# Patient Record
Sex: FEMALE | Race: WHITE | NOT HISPANIC OR LATINO
[De-identification: names, ages, dates, MRNs, and addresses within clinical notes are randomized per-mention and may not be internally consistent; named-entity substitution may affect disease eponyms.]

---

## 2018-12-04 ENCOUNTER — APPOINTMENT (OUTPATIENT)
Dept: GASTROENTEROLOGY | Facility: HOSPITAL | Age: 68
End: 2018-12-04

## 2021-10-27 ENCOUNTER — APPOINTMENT (OUTPATIENT)
Dept: GASTROENTEROLOGY | Facility: CLINIC | Age: 71
End: 2021-10-27
Payer: MEDICARE

## 2021-10-27 VITALS
DIASTOLIC BLOOD PRESSURE: 70 MMHG | SYSTOLIC BLOOD PRESSURE: 140 MMHG | BODY MASS INDEX: 29.88 KG/M2 | WEIGHT: 175 LBS | TEMPERATURE: 97.7 F | HEIGHT: 64 IN | HEART RATE: 96 BPM | OXYGEN SATURATION: 98 %

## 2021-10-27 DIAGNOSIS — R19.7 DIARRHEA, UNSPECIFIED: ICD-10-CM

## 2021-10-27 DIAGNOSIS — Z86.79 PERSONAL HISTORY OF OTHER DISEASES OF THE CIRCULATORY SYSTEM: ICD-10-CM

## 2021-10-27 DIAGNOSIS — Z78.9 OTHER SPECIFIED HEALTH STATUS: ICD-10-CM

## 2021-10-27 PROCEDURE — 99214 OFFICE O/P EST MOD 30 MIN: CPT

## 2021-10-27 RX ORDER — IRBESARTAN 75 MG/1
75 TABLET ORAL
Refills: 0 | Status: ACTIVE | COMMUNITY

## 2021-10-27 RX ORDER — ROSUVASTATIN CALCIUM 5 MG/1
5 TABLET, FILM COATED ORAL
Refills: 0 | Status: ACTIVE | COMMUNITY

## 2021-10-27 RX ORDER — FAMOTIDINE 40 MG/1
40 TABLET, FILM COATED ORAL
Refills: 0 | Status: ACTIVE | COMMUNITY

## 2021-10-27 NOTE — HISTORY OF PRESENT ILLNESS
[de-identified] : Presents  after episode  of  diverticulitis  in June 2021 and  again in Oct 2021.  Currently painfree after 2  courses of  Augmentin.  Only current  complaint is  diarrhea ( improving on probiotics) \par \par Note  reviewed from PMD Nelia Goodwin MD  was notable  for abdominal pain that stated Sept 25, 2021.  CT scan on 10/21/21 c/w  diverticulitis ( report reviewed).  Treated  with Augmentin for   diverticulitis.  Pain returned after course of  BID  Augmentin...retreated with TID  dosing ( limited by nausea).  Apparently had  CT proven diverticulitis  ~  10 years ago. \par \par  At PMD visit on 10/20/21 c/o 3  days  of  watery stool. C.diff was indeterminate  / WBC = 9.4 / normal CMET.  \par \par -EGD / Colonoscopy 12/2018 notable  for gastritis  / duodenal villous  blunting /  / hemorrhoids / diverticulosis.  Labs  were also c/w  celiac  disease. Gluten free diet was recommended\par \par Pertinent available records reviewed\par

## 2021-10-27 NOTE — ASSESSMENT
[FreeTextEntry1] : Diverticulitis...2  episodes in 3  months.  Would  consider surgical evaluation if  has  additional episodes. Low  fiber diet  x  2  weeks , then high fiber. Stool for C. diff if  diarrhea persists past Monday ( stool kit  given to patient) .  Continue  probiotic

## 2022-11-04 ENCOUNTER — NON-APPOINTMENT (OUTPATIENT)
Age: 72
End: 2022-11-04

## 2022-11-14 ENCOUNTER — APPOINTMENT (OUTPATIENT)
Dept: BREAST CENTER | Facility: CLINIC | Age: 72
End: 2022-11-14

## 2022-11-14 ENCOUNTER — NON-APPOINTMENT (OUTPATIENT)
Age: 72
End: 2022-11-14

## 2022-11-14 VITALS
HEIGHT: 64 IN | BODY MASS INDEX: 29.02 KG/M2 | SYSTOLIC BLOOD PRESSURE: 171 MMHG | DIASTOLIC BLOOD PRESSURE: 81 MMHG | WEIGHT: 170 LBS | HEART RATE: 81 BPM

## 2022-11-14 DIAGNOSIS — Z87.19 PERSONAL HISTORY OF OTHER DISEASES OF THE DIGESTIVE SYSTEM: ICD-10-CM

## 2022-11-14 DIAGNOSIS — Z78.9 OTHER SPECIFIED HEALTH STATUS: ICD-10-CM

## 2022-11-14 DIAGNOSIS — Z80.0 FAMILY HISTORY OF MALIGNANT NEOPLASM OF DIGESTIVE ORGANS: ICD-10-CM

## 2022-11-14 DIAGNOSIS — M65.30 TRIGGER FINGER, UNSPECIFIED FINGER: ICD-10-CM

## 2022-11-14 PROCEDURE — 99205 OFFICE O/P NEW HI 60 MIN: CPT

## 2022-11-14 RX ORDER — AMOXICILLIN AND CLAVULANATE POTASSIUM 875; 125 MG/1; MG/1
875-125 TABLET, COATED ORAL
Qty: 14 | Refills: 0 | Status: DISCONTINUED | COMMUNITY
Start: 2022-06-01 | End: 2022-11-14

## 2022-11-14 NOTE — CONSULT LETTER
[Dear  ___] : Dear  [unfilled], [( Thank you for referring [unfilled] for consultation for _____ )] : Thank you for referring [unfilled] for consultation for [unfilled] [Please see my note below.] : Please see my note below. [Consult Closing:] : Thank you very much for allowing me to participate in the care of this patient.  If you have any questions, please do not hesitate to contact me. [Sincerely,] : Sincerely, [FreeTextEntry3] : An Rocha MS DO\par Breast Surgeon\par [facility] \[place] [address]\par

## 2022-11-14 NOTE — PHYSICAL EXAM
[Normocephalic] : normocephalic [Atraumatic] : atraumatic [Supple] : supple [No Supraclavicular Adenopathy] : no supraclavicular adenopathy [Examined in the supine and seated position] : examined in the supine and seated position [No Nipple Retraction] : no left nipple retraction [No Nipple Discharge] : no left nipple discharge [No Axillary Lymphadenopathy] : no left axillary lymphadenopathy [No Edema] : no edema [No Rashes] : no rashes [No Ulceration] : no ulceration [Symmetrical] : symmetrical [de-identified] : retroareolar sebaceous cyst, nontender, no redness [de-identified] : healing bx site with associated induration [de-identified] : right nipple crease (chronic)

## 2022-11-14 NOTE — REVIEW OF SYSTEMS
[Eyes Itch] : itching of the eyes [Nasal Discharge] : nasal discharge [Abdominal Pain] : abdominal pain [Constipation] : constipation [Heartburn] : heartburn [Joint Swelling] : joint swelling [Joint Stiffness] : joint stiffness [Hand Pain] : hand pain [Hand Stiffness] : stiffness of the hand [Breast Lump] : breast lump [Negative] : Heme/Lymph [FreeTextEntry2] : joint pain

## 2022-11-14 NOTE — HISTORY OF PRESENT ILLNESS
[FreeTextEntry1] : This is a 71 year old female referred by Dr. Richardson for newly diagnosed ductal carcinoma in situ of the left breast.  The patient underwent screening mammogram and ultrasound for density on 10/20/2022 at [facility]. Mammographic findings showed a cluster of indeterminate coarse calcifications in the left upper slightly medial breast. A stereotactic biopsy was recommended. Ultrasound showed no suspicious findings. The patient underwent a left 12:00 (U-shaped clip) on 11/1/2022. Pathology findings showed low to intermediate grade DCIS, ER moderately to strongly positive at >90%, [place] moderate to strongly positive at 5-10%.\par The patient states she had one breast biopsy at [facility] 15 years ago on the left side. Which was ultrasound and no clip was left. She also saw Dr. Ibrahim about 10 years ago and had a left breast cyst drained. She presents with her  for consultation. She is retried from accounting/bookkeeping.\par \par She does SBE. \par She has not noticed a change in her breast or a breast lump.\par She has not noticed a change in her nipple or nipple area. She states her right nipple has been creased for as long as she can recall.\par She has not noticed a change in the skin of the breast.\par She is not experiencing nipple discharge.\par She is not experiencing breast pain.\par She has not noticed a lump or lymph node under the armpit. \par \par BREAST CANCER RISK FACTORS\par Menarche: 12\par Date of LMP:1996\par Menopause:Post, age unknown\par Grav: 2      Para:  2\par Age at first live birth: 27\par Nursed: no\par Hysterectomy: Y 1996\par Oophorectomy: N\par OCP:  yes 5y\par HRT: no\par Last pap/pelvic exam: 2021 WNL\par Related family history:  SIster Breast CA age 55\par Ashkenazi: no\par Mastery risk assessment:  n/a\par BRCA testing: yes sister tested negative\par Bra size:  40B\par \par Last mammogram:  10/20/2022                            Location:[facility]\par Report reviewed.                                 Images reviewed.\par Results: Birads 4\par Left upper slightly medial breast cluster of indeterminate coarse calcifications. Rec: stereotactic biopsy \par \par Last ultrasound:  10/20/2020               Location: [facility]\par Report reviewed.                                 Images reviewed. \par Results: Birads 2\par No evidence of malignancy\par \par Last MRI:  never                                           Location:\par Report reviewed.\par

## 2022-11-17 ENCOUNTER — RESULT REVIEW (OUTPATIENT)
Age: 72
End: 2022-11-17

## 2022-11-20 DIAGNOSIS — N60.81 OTHER BENIGN MAMMARY DYSPLASIAS OF RIGHT BREAST: ICD-10-CM

## 2022-11-22 ENCOUNTER — APPOINTMENT (OUTPATIENT)
Dept: HEMATOLOGY ONCOLOGY | Facility: CLINIC | Age: 72
End: 2022-11-22

## 2022-11-22 NOTE — DISCUSSION/SUMMARY
[FreeTextEntry1] : The visit was provided via telehealth using real-time 2-way audio visual technology. The patient, Aleshia Chu, was located at home, in [place], [place], at the time of the visit. The provider, Michelle Merlos, was located at the medical office in [place], [place] at the time of the visit. Verbal consent for telehealth services was given on 22 by the patient, Aleshia Chu.\par \par REASON FOR CONSULT\par Aleshia Chu is a 71-year-old female referred by Dr. An Rocha for cancer genetic counseling and risk assessment due to a new diagnosis of breast cancer. Ms. Chu was seen via telehealth on 2022 at which time medical and family history was ascertained and a pedigree constructed. \par \par RELEVANT MEDICAL HISTORY\par Ms. Chu was diagnosed with a new left breast cancer at the age of 71. Pathology report revealed ductal carcinoma in situ (ER+/[place]+). Lumpectomy is currently scheduled for 22. \par \par OTHER MEDICAL AND SURGICAL HISTORY:\par •	Medical History: Celiac disease, diverticulitis\par •	Surgical History: tonsillectomy, hysterectomy  d/t fibroids (ovaries reportedly remain intact)\par \par OB/GYN HISTORY:\par Obstetrical History: \par Age at Menarche: 12\par Menopausal Status: Post-menopausal with LMP  d/t hysterectomy\par Age at First Live Birth: 27\par Oral Contraceptive Use: Yes, 5-7 years\par Hormone Replacement Therapy: No\par \par CANCER SCREENING HISTORY:  \par Breast: \par •	Mammography: 10/20/22- rec left breast biopsy\par •	Sonography: 10/20/22- rec left breast biopsy\par •	MRI: Declined, unable to lie prone\par GYN:\par •	Pelvic Examination: Annual- reportedly wnl, history of fibroids\par Colon:\par •	Colonoscopy: 2018- no polyps identified, repeat in 5-10 years\par Skin:  \par •	FBSE: Yes\par •	Lesions biopsied/removed: No\par \par SOCIAL HISTORY:\par •	Tobacco-product use: No\par •	Environmental exposures: No \par \par FAMILY HISTORY:\par Maternal ancestry was reported as Areli and paternal ancestry was reported as British/Yoruba. Ashkenazi Mandaen ancestry was denied. A detailed family history of cancer was ascertained, see below and scanned chart for pedigree. \par \par 	Of note, Ms. Chu reported her sister went through her own breast cancer diagnosis and treatment around 10 years ago and reportedly underwent genetic testing with negative results however a copy of her report was unavailable to review today.\par \par According to Ms. Chu no one else in the family has had germline testing for cancer susceptibility. Consanguinity was denied. \par 	\par RISK ASSESSMENT:\par Ms. Chu’s personal and family history is suggestive of a hereditary cancer syndrome given her new diagnosis of breast cancer at the age of 71, her sister’s history of breast cancer, and her mother’s history of pancreatic cancer. The patient meets National Comprehensive Cancer Network (NCCN) criteria for genetic testing. \par \par Given that genetic testing may impact her treatment plans, we recommended the [facility] Breast STAT Panel testing for genes associated with breast cancer (typically results within 5-12 days). This test analyzes 9 genes: MARIA, BRCA1, BRCA2, CDH1, CHEK2, PALB2, PTEN, STK11, and TP53.\par \par The risks, benefits and limitations of genetic testing were discussed with Ms. Chu. In addition, we discussed the purpose of genetic testing and possible test results (positive, negative, inconclusive) along with associated medical management options and psychosocial implications. Insurance coverage and potential out of pocket costs were also discussed. \par \par It was explained that risk assessment is based upon medical and family history as provided and may change in the future should new information be obtained. \par \par Following our discussion, Ms. Chu consented to the above-mentioned genetic testing panel. A saliva kit was previously given to the patient during her visit with Dr. Rocha and she will submit her sample to [facility] today.\par \par PLAN:\par \par 1.	Saliva sample will be collected and sent to [facility] for analysis. \par 2.	We will contact Ms. Chu to schedule a follow-up appointment once the results are available. Results from the STAT panel generally return 5-12 days after the lab receives the patient’s sample. \par \par For any additional questions please call Cancer Genetics at [phone]. \par \par \par Michelle Merlos MS, [facility]\par Genetic Counselor, Cancer Genetics\par \par \par \par \par \par \par

## 2022-11-29 ENCOUNTER — NON-APPOINTMENT (OUTPATIENT)
Age: 72
End: 2022-11-29

## 2022-12-06 ENCOUNTER — NON-APPOINTMENT (OUTPATIENT)
Age: 72
End: 2022-12-06

## 2022-12-06 NOTE — DISCUSSION/SUMMARY
[FreeTextEntry1] : REASON FOR CONSULT\par Aleshia Chu is a 71-year-old female who was contacted on December 6, 2022 for a discussion regarding her negative genetic testing results related to hereditary cancer predisposition. This session was conducted via telephone. \par \par Ms. Chu was originally seen by the Cancer Genetics Service via telehealth on November 22, 2022 for hereditary cancer predisposition risk assessment due to a new diagnosis of breast cancer. At that time, Ms. Chu decided to pursue genetic testing using [facility] breast STAT panel.\par \par TEST RESULTS: NEGATIVE\par NO pathogenic (disease-causing) variants or variants of uncertain significance were detected in any of the following genes [9]:  MARIA, BRCA1, BRCA2, CDH1, CHEK2, PALB2, PTEN, STK11, and TP53.\par \par RESULTS INTERPRETATION AND ASSESSMENT:\par Given Ms. Chu’s personal and current reported family history of cancer, and her negative genetic test results, the following screening guidelines and risk-reducing recommendations were discussed:\par \par BREAST: \par •	It was discussed these negative results should not impact patient’s surgical plan. \par •	Long-term management and surveillance should be based on Ms. Chu’s on- or post-treatment protocol as recommended by her breast care team. \par \par OTHER:\par •	In the absence of other indications, Ms. Chu should practice age-appropriate cancer screening of other organ systems as recommended for the general population.\par \par \par We also discussed the limitations of negative results:\par 1.	The cause of Ms. Chu’s personal and family history of cancer remains unknown. The cancer(s) may have developed randomly, or due to environmental factors.  \par 2.	This negative result does not completely rule out a hereditary basis for the reported personal and/or family history due to limitations in technology or a variant being present in an unidentified gene. \par 3.	Variants in other genes would not be identified by this analysis, so this negative result does not rule out the likelihood of having a mutation in a different hereditary cancer gene or the possibility of ever developing cancer.\par 4.	It is possible there is a hereditary cancer predisposition gene mutation in the family, but the patient did not inherit it. \par \par We informed Ms. Chu that our knowledge of genetics and inherited cancer conditions is changing rapidly. Therefore, we recommended that Ms. Chu contact our office, every 2 to 3 years, to discuss relevant advances in cancer genetics.  We emphasized the importance of re-contacting us with updates regarding her personal and family history of cancer as well as any updates regarding additional cancer genetic test results performed for the patient and/or family members.  Such updates could possibly change our risk assessment and recommendations. \par \par We also discussed additional genetic testing for genes associated with breast, gynecological, and pancreatic cancer not included on her initial panel. Ms. Chu stated she would like to speak to her family about additional genetic testing first and will get back to us if she decides to proceed.\par \par PLAN:\par 1.	These results do not change Ms. Chu’s medical management. Long-term management and surveillance should be based on the patient’s on- or post-treatment protocol as recommended by her breast care team (and general population guidelines for other cancers).\par 2.	Patient informed consult note(s) will be available through their [facility] patient portal and genetic test results will be released via [facility]’s portal.\par 3.	Patient will think about whether she wants to pursue additional genetic testing beyond the genes included on the STAT and will get back to us. \par 4.	Ms. Chu was encouraged to contact us every 2-3 years to discuss relevant advances in cancer genetics, or sooner if there are any changes in her personal or family history of cancer.\par \par \par For any additional questions please call Cancer Genetics at [phone]. \par \par \par Michelle Merlos MS, [facility]\par Genetic Counselor, Cancer Genetics\par \par \par \par \par \par

## 2022-12-08 ENCOUNTER — RESULT REVIEW (OUTPATIENT)
Age: 72
End: 2022-12-08

## 2022-12-08 ENCOUNTER — APPOINTMENT (OUTPATIENT)
Dept: BREAST CENTER | Facility: HOSPITAL | Age: 72
End: 2022-12-08

## 2022-12-08 PROCEDURE — 76098 X-RAY EXAM SURGICAL SPECIMEN: CPT | Mod: 26

## 2022-12-08 PROCEDURE — 14301 TIS TRNFR ANY 30.1-60 SQ CM: CPT

## 2022-12-08 PROCEDURE — 19301 PARTIAL MASTECTOMY: CPT | Mod: LT,59

## 2022-12-16 ENCOUNTER — APPOINTMENT (OUTPATIENT)
Dept: BREAST CENTER | Facility: CLINIC | Age: 72
End: 2022-12-16

## 2022-12-16 PROCEDURE — 99024 POSTOP FOLLOW-UP VISIT: CPT

## 2022-12-19 NOTE — HISTORY OF PRESENT ILLNESS
[FreeTextEntry1] : This is a 71 year old female referred by Dr. Richardson for newly diagnosed ductal carcinoma in situ of the left breast.  The patient underwent screening mammogram and ultrasound for density on 10/20/2022 at [facility]. Mammographic findings showed a cluster of indeterminate coarse calcifications in the left upper slightly medial breast. A stereotactic biopsy was recommended. Ultrasound showed no suspicious findings. The patient underwent a left 12:00 (U-shaped clip) on 11/1/2022. Pathology findings showed low to intermediate grade DCIS, ER moderately to strongly positive at >90%, [place] moderate to strongly positive at 5-10%.\par The patient states she had one breast biopsy at [facility] 15 years ago on the left side. Which was ultrasound and no clip was left. She also saw Dr. Ibrahim about 10 years ago and had a left breast cyst drained. She presents with her  for consultation. She is retried from accounting/bookkeeping.\par \par She is s/p left partial mastectomy on 12/8/2022 pathology showed 10mm DCIS intermediate grade, focal involvment of inferior marging and 1.5mm margin anterior and medial, rest >4mm, ER positive (%, [place] positive 5%. Doing well post op.\par \par She does SBE. \par She has not noticed a change in her breast or a breast lump.\par She has not noticed a change in her nipple or nipple area. She states her right nipple has been creased for as long as she can recall.\par She has not noticed a change in the skin of the breast.\par She is not experiencing nipple discharge.\par She is not experiencing breast pain.\par She has not noticed a lump or lymph node under the armpit. \par \par BREAST CANCER RISK FACTORS\par Menarche: 12\par Date of LMP:1996\par Menopause:Post, age unknown\par Grav: 2      Para:  2\par Age at first live birth: 27\par Nursed: no\par Hysterectomy: Y 1996\par Oophorectomy: N\par OCP:  yes 5y\par HRT: no\par Last pap/pelvic exam: 2021 WNL\par Related family history:  SIster Breast CA age 55\par Ashkenazi: no\par Mastery risk assessment:  n/a\par BRCA testing: yes sister tested negative\par Bra size:  40B\par \par Last mammogram:  10/20/2022                            Location:[facility]\par Report reviewed.                                 Images reviewed.\par Results: Birads 4\par Left upper slightly medial breast cluster of indeterminate coarse calcifications. Rec: stereotactic biopsy \par \par Last ultrasound:  10/20/2020               Location: [facility]\par Report reviewed.                                 Images reviewed. \par Results: Birads 2\par No evidence of malignancy\par \par Last MRI:  never                                           Location:\par Report reviewed.\par

## 2022-12-19 NOTE — ASSESSMENT
[FreeTextEntry1] : doing well\par path reviewed copy given\par rec re-excision for inferior/anterior/ medial margin\par has appt next week\par i believe i was able to answer her questions\par She knows to call or return sooner should any concerns or questions arise.\par

## 2022-12-20 ENCOUNTER — APPOINTMENT (OUTPATIENT)
Dept: BREAST CENTER | Facility: HOSPITAL | Age: 72
End: 2022-12-20

## 2022-12-20 ENCOUNTER — RESULT REVIEW (OUTPATIENT)
Age: 72
End: 2022-12-20

## 2022-12-20 PROCEDURE — 14301 TIS TRNFR ANY 30.1-60 SQ CM: CPT | Mod: 58

## 2022-12-20 PROCEDURE — 19301 PARTIAL MASTECTOMY: CPT | Mod: LT,59,58

## 2022-12-23 DIAGNOSIS — R21 RASH AND OTHER NONSPECIFIC SKIN ERUPTION: ICD-10-CM

## 2022-12-23 RX ORDER — METHYLPREDNISOLONE 4 MG/1
4 TABLET ORAL
Qty: 1 | Refills: 0 | Status: ACTIVE | COMMUNITY
Start: 2022-12-23 | End: 1900-01-01

## 2023-01-03 ENCOUNTER — APPOINTMENT (OUTPATIENT)
Dept: BREAST CENTER | Facility: CLINIC | Age: 73
End: 2023-01-03
Payer: MEDICARE

## 2023-01-03 PROCEDURE — 99024 POSTOP FOLLOW-UP VISIT: CPT

## 2023-01-03 NOTE — ASSESSMENT
[FreeTextEntry1] : doing well\par path reviewed copy given\par rec med/onc cs and rad/onc she prefers [facility] location\par f/u 1 month\par She knows to call or return sooner should any concerns or questions arise.\par

## 2023-01-03 NOTE — PHYSICAL EXAM
[de-identified] : healed incision [de-identified] : healing periareolar incision, UIQ there is nodularity

## 2023-01-03 NOTE — HISTORY OF PRESENT ILLNESS
[FreeTextEntry1] : This is a 72 year old female referred by Dr. Richardson for newly diagnosed ductal carcinoma in situ of the left breast.  The patient underwent screening mammogram and ultrasound for density on 10/20/2022 at [facility]. Mammographic findings showed a cluster of indeterminate coarse calcifications in the left upper slightly medial breast. A stereotactic biopsy was recommended. Ultrasound showed no suspicious findings. The patient underwent a left 12:00 (U-shaped clip) on 11/1/2022. Pathology findings showed low to intermediate grade DCIS, ER moderately to strongly positive at >90%, [place] moderate to strongly positive at 5-10%.\par The patient states she had one breast biopsy at [facility] 15 years ago on the left side. Which was ultrasound and no clip was left. She also saw Dr. Ibrahim about 10 years ago and had a left breast cyst drained. She presents with her  for consultation. She is retried from accounting/bookkeeping.\par \par She is s/p left partial mastectomy on 12/8/2022 pathology showed 10mm DCIS intermediate grade, focal involvment of inferior marging and 1.5mm margin anterior and medial, rest >4mm, ER positive (%, [place] positive 5%. Doing well post op. She had post op rash. She had re-excision of margins 12/20/22 anterior, medial, inferior which were all negative.\par \par She does SBE. \par She has not noticed a change in her breast or a breast lump.\par She has not noticed a change in her nipple or nipple area. She states her right nipple has been creased for as long as she can recall.\par She has not noticed a change in the skin of the breast.\par She is not experiencing nipple discharge.\par She is not experiencing breast pain.\par She has not noticed a lump or lymph node under the armpit. \par \par BREAST CANCER RISK FACTORS\par Menarche: 12\par Date of LMP:1996\par Menopause:Post, age unknown\par Grav: 2      Para:  2\par Age at first live birth: 27\par Nursed: no\par Hysterectomy: Y 1996\par Oophorectomy: N\par OCP:  yes 5y\par HRT: no\par Last pap/pelvic exam: 2021 WNL\par Related family history:  SIster Breast CA age 55\par Ashkenazi: no\par Mastery risk assessment:  n/a\par BRCA testing: yes sister tested negative\par Bra size:  40B\par \par Last mammogram:  10/20/2022                            Location:[facility]\par Report reviewed.                                 Images reviewed.\par Results: Birads 4\par Left upper slightly medial breast cluster of indeterminate coarse calcifications. Rec: stereotactic biopsy \par \par Last ultrasound:  10/20/2020               Location: [facility]\par Report reviewed.                                 Images reviewed. \par Results: Birads 2\par No evidence of malignancy\par \par Last MRI:  never                                           Location:\par Report reviewed.\par

## 2023-01-11 ENCOUNTER — APPOINTMENT (OUTPATIENT)
Dept: RADIATION ONCOLOGY | Facility: CLINIC | Age: 73
End: 2023-01-11
Payer: MEDICARE

## 2023-01-11 PROCEDURE — 99205 OFFICE O/P NEW HI 60 MIN: CPT | Mod: 25

## 2023-01-12 VITALS
BODY MASS INDEX: 29.02 KG/M2 | OXYGEN SATURATION: 98 % | HEIGHT: 64 IN | HEART RATE: 80 BPM | DIASTOLIC BLOOD PRESSURE: 80 MMHG | RESPIRATION RATE: 18 BRPM | SYSTOLIC BLOOD PRESSURE: 160 MMHG | WEIGHT: 170 LBS

## 2023-01-12 NOTE — DISEASE MANAGEMENT
[Pathological] : TNM Stage: p [0] : 0 [FreeTextEntry4] : DCIS of the Left Breast, ER/NJ+ [TTNM] : is [NTNM] : X [MTNM] : X

## 2023-01-12 NOTE — LETTER CLOSING
[Consult Closing:] : Thank you for allowing me to participate in the care of this patient.  If you have any questions, please do not hesitate to contact me. [Sincerely yours,] : Sincerely yours, [FreeTextEntry3] : Stephanie Wadsworth MD\par

## 2023-01-12 NOTE — HISTORY OF PRESENT ILLNESS
[FreeTextEntry1] : Ms. Chu is 72 year old female recently diagnosed with Stage 0 ER/NH+ DCIS of the left breast.  She and her  present for a consultation to discuss adjuvant radiation therapy. \par \par Ms Chu underwent bilateral mammogram and US (10/20/22) that demonstrated indeterminate left breast calcifications. No abnormal lymph nodes were demonstrated. She underwent a core needle biopsy performed on 22 that revealed DCIS, low to intermediate nuclear grade. ER + >90% and [place] + 5-10%. \par \par On 22, she underwent left partial mastectomy performed by Dr. Rocha and pathology revealed:\par Ductal carcinoma in situ, Size (Extent) of DCIS: 10 Millimeters (mm), Nuclear Grade: Grade II (intermediate), Necrosis: Present, focal (small foci or single cell necrosis), Microcalcifications: Present in DCIS, Margin Status: DCIS present at margin, Margin(s) Involved by DCIS: Inferior - focal, on one slide, pT Category: pTis (DCIS), Estrogen Receptor (ER) Status: Positive / Estrogen Receptor (ER) Status: Positive\par \par On 22, she underwent a margin re-excision of the anterior, new medial, and new inferior margin. Pathology was negative for residual carcinoma. \par \par Genetic testing was negative. \par \par GYN HX: \par Menarche: 12, Date of LMP:, Menopause: post, , hysterectomy in , OCP 5 yrs, no HRT. \par Family Hx: sister, breast cancer, 55

## 2023-01-24 ENCOUNTER — RESULT REVIEW (OUTPATIENT)
Age: 73
End: 2023-01-24

## 2023-01-24 ENCOUNTER — APPOINTMENT (OUTPATIENT)
Dept: HEMATOLOGY ONCOLOGY | Facility: CLINIC | Age: 73
End: 2023-01-24
Payer: MEDICARE

## 2023-01-24 VITALS
WEIGHT: 173.25 LBS | HEIGHT: 64 IN | HEART RATE: 90 BPM | OXYGEN SATURATION: 97 % | DIASTOLIC BLOOD PRESSURE: 95 MMHG | TEMPERATURE: 97.2 F | SYSTOLIC BLOOD PRESSURE: 167 MMHG | BODY MASS INDEX: 29.58 KG/M2 | RESPIRATION RATE: 16 BRPM

## 2023-01-24 DIAGNOSIS — Z00.00 ENCOUNTER FOR GENERAL ADULT MEDICAL EXAMINATION W/OUT ABNORMAL FINDINGS: ICD-10-CM

## 2023-01-24 PROCEDURE — 36415 COLL VENOUS BLD VENIPUNCTURE: CPT

## 2023-01-24 PROCEDURE — 99205 OFFICE O/P NEW HI 60 MIN: CPT | Mod: 25

## 2023-01-24 NOTE — PHYSICAL EXAM
[Fully active, able to carry on all pre-disease performance without restriction] : Status 0 - Fully active, able to carry on all pre-disease performance without restriction [Normal] : affect appropriate [de-identified] : b/l healed scars

## 2023-01-24 NOTE — HISTORY OF PRESENT ILLNESS
[de-identified] : 72 year old female presents today for initial consultation of DCIS, referred by Josefina.  The patient underwent screening mammogram and ultrasound for density on 10/20/2022 at [facility]. Mammographic findings showed a cluster of indeterminate coarse calcifications in the left upper slightly medial breast. A stereotactic biopsy was recommended. Ultrasound showed no suspicious findings. The patient underwent a left 12:00 on 2022. Pathology findings showed low to intermediate grade DCIS, ER moderately to strongly positive at >90%, NE moderate to strongly positive at 5-10%.  An MRI was recommended, however the patient was unable to lay prone for the procedure.  \par \par She underwent a left partial mastectomy on 2022, pathology showed 10mm DCIS intermediate grade, focal involvement of inferior margin and 1.5mm margin anterior and medial, rest >4mm, ER positive (%, NE positive 5%.  She had re-excision of margins 22 anterior, medial, inferior which were all negative.\par \par She met with Dr. Wadsworth in consultation to discuss adjuvant radiation to the left breast to control local control and decrease her risk of a perinvasive or invasive recurrence, they obtained a DCISion RT.  \par \par Last BMD- [facility] - unsure of results \par \par BREAST CANCER RISK FACTORS\par Menarche: 12\par Date of LMP:\par Menopause:49\par Grav: 2 Para: 2\par Age at first live birth: 27\par Nursed: no\par Hysterectomy: Y, partial in  due to fibroids\par Oophorectomy: N\par OCP: yes 5y\par HRT: Vagifem x3-4 years\par Last pap/pelvic exam:  WNL\par Related family history: SIster Breast CA age 55, alive and well, mother with pancreatic cancer diagnosed  at 81\par Ashkenazi: no\par Genetic testing- negative?  \par \par \par

## 2023-01-24 NOTE — ASSESSMENT
[FreeTextEntry1] : 72 year old female referred for evaluation of DCIS diagnosed after mammogram on 10/20/2022 showed a cluster of indeterminate coarse calcifications in the left upper slightly medial breast.\par Pathology findings showed low to intermediate grade DCIS, ER moderately to strongly positive at >90%, TX moderate to strongly positive at 5-10%.  An MRI was recommended, however the patient was unable to lay prone for the procedure.  \par \par She underwent a left partial mastectomy on 12/8/2022, pathology showed \par # left breast 10mm DCIS intermediate grade, focal involvement of inferior margin and 1.5mm margin anterior and medial, rest >4mm, ER positive (%, TX positive 5%.  She had re-excision of margins 12/20/22 anterior, medial, inferior which were all negative.\par # right breast - benign\par \par We reviewed pathology report, prognostic and therapeutic implications of receptor status, indication for risk reduction treatment with either SERM or AI.  Side effects and monitoring parameters were reviewed with patient.\par \par She was offered anastrozole.\par \par Dexa report ordered.\par \par

## 2023-02-10 ENCOUNTER — APPOINTMENT (OUTPATIENT)
Dept: BREAST CENTER | Facility: CLINIC | Age: 73
End: 2023-02-10
Payer: MEDICARE

## 2023-02-10 PROCEDURE — 99024 POSTOP FOLLOW-UP VISIT: CPT

## 2023-02-10 NOTE — HISTORY OF PRESENT ILLNESS
[FreeTextEntry1] : This is a 72 year old female referred by Dr. Richardson for newly diagnosed ductal carcinoma in situ of the left breast.  The patient underwent screening mammogram and ultrasound for density on 10/20/2022 at [facility]. Mammographic findings showed a cluster of indeterminate coarse calcifications in the left upper slightly medial breast. A stereotactic biopsy was recommended. Ultrasound showed no suspicious findings. The patient underwent a left 12:00 (U-shaped clip) on 11/1/2022. Pathology findings showed low to intermediate grade DCIS, ER moderately to strongly positive at >90%, OH moderate to strongly positive at 5-10%.\par The patient states she had one breast biopsy at [facility] 15 years ago on the left side. Which was ultrasound and no clip was left. She also saw Dr. Ibrahim about 10 years ago and had a left breast cyst drained. She presents with her  for consultation. She is retried from accounting/bookkeeping.\par \par She is s/p left partial mastectomy on 12/8/2022 pathology showed 10mm DCIS intermediate grade, focal involvment of inferior marging and 1.5mm margin anterior and medial, rest >4mm, ER positive (%, OH positive 5%. Doing well post op. She had post op rash. She had re-excision of margins 12/20/22 anterior, medial, inferior which were all negative. She saw Dr. Alfaro. DCision RT was low and RTx is being omitted. She started anastrozole with 2/1/2023.\par \par She does SBE. \par She has not noticed a change in her breast or a breast lump.\par She has not noticed a change in her nipple or nipple area. She states her right nipple has been creased for as long as she can recall.\par She has not noticed a change in the skin of the breast.\par She is not experiencing nipple discharge.\par She is not experiencing breast pain.\par She has not noticed a lump or lymph node under the armpit. \par \par BREAST CANCER RISK FACTORS\par Menarche: 12\par Date of LMP:1996\par Menopause:Post, age unknown\par Grav: 2      Para:  2\par Age at first live birth: 27\par Nursed: no\par Hysterectomy: Y 1996\par Oophorectomy: N\par OCP:  yes 5y\par HRT: no\par Last pap/pelvic exam: 2021 WNL\par Related family history:  SIster Breast CA age 55\par Ashkenazi: no\par Mastery risk assessment:  n/a\par BRCA testing: yes sister tested negative\par Bra size:  40B\par \par Last mammogram:  10/20/2022                            Location:[facility]\par Report reviewed.                                 Images reviewed.\par Results: Birads 4\par Left upper slightly medial breast cluster of indeterminate coarse calcifications. Rec: stereotactic biopsy \par \par Last ultrasound:  10/20/2020               Location: [facility]\par Report reviewed.                                 Images reviewed. \par Results: Birads 2\par No evidence of malignancy\par \par Last MRI:  never                                           Location:\par Report reviewed.\par

## 2023-02-10 NOTE — PHYSICAL EXAM
[Symmetrical] : symmetrical [de-identified] : there is a slight contour deformity in the upper quadrant with defect as expected

## 2023-02-10 NOTE — ASSESSMENT
[FreeTextEntry1] : doing well\par cont AI And surveillance per Dr. Ferguson\par rec vitamin e oil and lotion daily\par plan mg/sono 10/2023\par f/u OCT 2023\par f/u after imaging\par she is still deciding on PCP\par She knows to call or return sooner should any concerns or questions arise.\par

## 2023-03-23 ENCOUNTER — RESULT REVIEW (OUTPATIENT)
Age: 73
End: 2023-03-23

## 2023-03-23 ENCOUNTER — APPOINTMENT (OUTPATIENT)
Dept: HEMATOLOGY ONCOLOGY | Facility: CLINIC | Age: 73
End: 2023-03-23
Payer: MEDICARE

## 2023-03-23 VITALS
WEIGHT: 175 LBS | RESPIRATION RATE: 17 BRPM | HEART RATE: 81 BPM | OXYGEN SATURATION: 96 % | TEMPERATURE: 97.3 F | DIASTOLIC BLOOD PRESSURE: 83 MMHG | BODY MASS INDEX: 29.88 KG/M2 | SYSTOLIC BLOOD PRESSURE: 157 MMHG | HEIGHT: 64 IN

## 2023-03-23 PROCEDURE — 99213 OFFICE O/P EST LOW 20 MIN: CPT | Mod: 25

## 2023-03-23 PROCEDURE — 36415 COLL VENOUS BLD VENIPUNCTURE: CPT

## 2023-03-29 NOTE — ASSESSMENT
[FreeTextEntry1] : 72 year old female referred for evaluation of DCIS diagnosed after mammogram on 10/20/2022 showed a cluster of indeterminate coarse calcifications in the left upper slightly medial breast.\par Pathology findings showed low to intermediate grade DCIS, ER moderately to strongly positive at >90%, WA moderate to strongly positive at 5-10%.  An MRI was recommended, however the patient was unable to lay prone for the procedure.  \par \par She underwent a left partial mastectomy on 12/8/2022, pathology showed \par # left breast 10mm DCIS intermediate grade, focal involvement of inferior margin and 1.5mm margin anterior and medial, rest >4mm, ER positive (%, WA positive 5%.  She had re-excision of margins 12/20/22 anterior, medial, inferior which were all negative.\par # right breast - benign\par \par We reviewed pathology report, prognostic and therapeutic implications of receptor status, indication for risk reduction treatment with either SERM or AI.  Side effects and monitoring parameters were reviewed with patient.\par \par She was offered anastrozole- beagan and taking AD\par \par #occasional headaches and ringing in ear- offered brain MRI, however patient declines at this time, stating improving, and if worsens will call office.  \par \par Dexa report ordered, still not performed\par \par Case and mgmt discussed with Dr. Ferguson- to return in 3 months, reg, breast tumor markers\par \par

## 2023-03-29 NOTE — PHYSICAL EXAM
[Fully active, able to carry on all pre-disease performance without restriction] : Status 0 - Fully active, able to carry on all pre-disease performance without restriction [Normal] : affect appropriate [de-identified] : b/l healed scars

## 2023-03-29 NOTE — HISTORY OF PRESENT ILLNESS
[de-identified] : 72 year old female presents today for initial consultation of DCIS, referred by Josefina.  The patient underwent screening mammogram and ultrasound for density on 10/20/2022 at [facility]. Mammographic findings showed a cluster of indeterminate coarse calcifications in the left upper slightly medial breast. A stereotactic biopsy was recommended. Ultrasound showed no suspicious findings. The patient underwent a left 12:00 on 2022. Pathology findings showed low to intermediate grade DCIS, ER moderately to strongly positive at >90%, WI moderate to strongly positive at 5-10%.  An MRI was recommended, however the patient was unable to lay prone for the procedure.  \par \par She underwent a left partial mastectomy on 2022, pathology showed 10mm DCIS intermediate grade, focal involvement of inferior margin and 1.5mm margin anterior and medial, rest >4mm, ER positive (%, WI positive 5%.  She had re-excision of margins 22 anterior, medial, inferior which were all negative.\par \par She met with Dr. Wadsworth in consultation to discuss adjuvant radiation to the left breast to control local control and decrease her risk of a perinvasive or invasive recurrence, they obtained a DCISion RT.  \par \par Last BMD- [facility] - unsure of results \par \par BREAST CANCER RISK FACTORS\par Menarche: 12\par Date of LMP:\par Menopause:49\par Grav: 2 Para: 2\par Age at first live birth: 27\par Nursed: no\par Hysterectomy: Y, partial in  due to fibroids\par Oophorectomy: N\par OCP: yes 5y\par HRT: Vagifem x3-4 years\par Last pap/pelvic exam:  WNL\par Related family history: SIster Breast CA age 55, alive and well, mother with pancreatic cancer diagnosed  at 81\par Ashkenazi: no\par Genetic testing- negative?  \par \par \par  [de-identified] : Pt presents for follow up of breast cancer-she is on anastrazole tolerating fairly well, having some headaches

## 2023-04-06 ENCOUNTER — RESULT REVIEW (OUTPATIENT)
Age: 73
End: 2023-04-06

## 2023-05-23 ENCOUNTER — RESULT REVIEW (OUTPATIENT)
Age: 73
End: 2023-05-23

## 2023-05-23 ENCOUNTER — APPOINTMENT (OUTPATIENT)
Dept: HEMATOLOGY ONCOLOGY | Facility: CLINIC | Age: 73
End: 2023-05-23
Payer: MEDICARE

## 2023-05-23 VITALS
TEMPERATURE: 97 F | OXYGEN SATURATION: 98 % | HEIGHT: 64 IN | BODY MASS INDEX: 30.61 KG/M2 | DIASTOLIC BLOOD PRESSURE: 89 MMHG | WEIGHT: 179.31 LBS | SYSTOLIC BLOOD PRESSURE: 157 MMHG | RESPIRATION RATE: 16 BRPM | HEART RATE: 77 BPM

## 2023-05-23 PROCEDURE — 36415 COLL VENOUS BLD VENIPUNCTURE: CPT

## 2023-05-23 PROCEDURE — 99214 OFFICE O/P EST MOD 30 MIN: CPT | Mod: 25

## 2023-05-23 NOTE — ASSESSMENT
[FreeTextEntry1] : 72 year old female referred for evaluation of DCIS diagnosed after mammogram on 10/20/2022 showed a cluster of indeterminate coarse calcifications in the left upper slightly medial breast.\par Pathology findings showed low to intermediate grade DCIS, ER moderately to strongly positive at >90%, AL moderate to strongly positive at 5-10%.  An MRI was recommended, however the patient was unable to lay prone for the procedure.  \par \par She underwent a left partial mastectomy on 12/8/2022, pathology showed \par # left breast 10mm DCIS intermediate grade, focal involvement of inferior margin and 1.5mm margin anterior and medial, rest >4mm, ER positive (%, AL positive 5%.  She had re-excision of margins 12/20/22 anterior, medial, inferior which were all negative.\par # right breast - benign\par \par We reviewed pathology report, prognostic and therapeutic implications of receptor status, indication for risk reduction treatment with either SERM or AI.  Side effects and monitoring parameters were reviewed with patient.\par \par She was offered anastrozole- began and taking AD\par \par #occasional headaches and drumming in the ear- offered brain MRI, however patient declines at this time, stating improving, and if worsens will call office.  Anastrozole started Feb 2023 and patient attributes HA to anastrozole.\par \par Hold anastrozole x 2 weeks - if HA resolves can rechallenge or change to exemestane.  If no resolution of the HA - neuro eval. \par \par # Osteopenia\par last bone density 4/23\par T-score -1.1\par Risk factors: postmenopausal, AI\par Recommended:\par 1. Vitamin D\par 2. Calcium supplement 500mg\par 3. Weight bearing exercises\par \par \par  to return in 3 months, reg, NO MARKERS \par

## 2023-05-23 NOTE — PHYSICAL EXAM
[Fully active, able to carry on all pre-disease performance without restriction] : Status 0 - Fully active, able to carry on all pre-disease performance without restriction [Normal] : affect appropriate [de-identified] : b/l healed scars

## 2023-05-23 NOTE — HISTORY OF PRESENT ILLNESS
[de-identified] : 72 year old female presents today for initial consultation of DCIS, referred by Josefina.  The patient underwent screening mammogram and ultrasound for density on 10/20/2022 at [facility]. Mammographic findings showed a cluster of indeterminate coarse calcifications in the left upper slightly medial breast. A stereotactic biopsy was recommended. Ultrasound showed no suspicious findings. The patient underwent a left 12:00 on 2022. Pathology findings showed low to intermediate grade DCIS, ER moderately to strongly positive at >90%, CO moderate to strongly positive at 5-10%.  An MRI was recommended, however the patient was unable to lay prone for the procedure.  \par \par She underwent a left partial mastectomy on 2022, pathology showed 10mm DCIS intermediate grade, focal involvement of inferior margin and 1.5mm margin anterior and medial, rest >4mm, ER positive (%, CO positive 5%.  She had re-excision of margins 22 anterior, medial, inferior which were all negative.\par \par She met with Dr. Wadsworth in consultation to discuss adjuvant radiation to the left breast to control local control and decrease her risk of a perinvasive or invasive recurrence, they obtained a DCISion RT.  \par \par Last BMD- [facility] - unsure of results \par \par BREAST CANCER RISK FACTORS\par Menarche: 12\par Date of LMP:\par Menopause:49\par Grav: 2 Para: 2\par Age at first live birth: 27\par Nursed: no\par Hysterectomy: Y, partial in  due to fibroids\par Oophorectomy: N\par OCP: yes 5y\par HRT: Vagifem x3-4 years\par Last pap/pelvic exam:  WNL\par Related family history: SIster Breast CA age 55, alive and well, mother with pancreatic cancer diagnosed  at 81\par Ashkenazi: no\par Genetic testing- negative?  \par \par \par  [de-identified] : Pt presents for follow up of breast cancer-she is on anastrazole tolerating fairly well, having some headaches

## 2023-07-05 ENCOUNTER — NON-APPOINTMENT (OUTPATIENT)
Age: 73
End: 2023-07-05

## 2023-07-05 ENCOUNTER — APPOINTMENT (OUTPATIENT)
Age: 73
End: 2023-07-05
Payer: MEDICARE

## 2023-07-05 VITALS
HEIGHT: 64 IN | SYSTOLIC BLOOD PRESSURE: 138 MMHG | BODY MASS INDEX: 30.73 KG/M2 | DIASTOLIC BLOOD PRESSURE: 80 MMHG | WEIGHT: 180 LBS | OXYGEN SATURATION: 97 % | RESPIRATION RATE: 72 BRPM | TEMPERATURE: 98.4 F

## 2023-07-05 DIAGNOSIS — E78.5 HYPERLIPIDEMIA, UNSPECIFIED: ICD-10-CM

## 2023-07-05 PROCEDURE — 36415 COLL VENOUS BLD VENIPUNCTURE: CPT

## 2023-07-05 PROCEDURE — G0439: CPT

## 2023-07-05 NOTE — PHYSICAL EXAM
[No Abdominal Bruit] : a ~M bruit was not heard ~T in the abdomen [Pedal Pulses Present] : the pedal pulses are present [Normal Posterior Cervical Nodes] : no posterior cervical lymphadenopathy [Normal Anterior Cervical Nodes] : no anterior cervical lymphadenopathy [Normal] : affect was normal and insight and judgment were intact [de-identified] : h

## 2023-07-05 NOTE — HEALTH RISK ASSESSMENT
[Very Good] : ~his/her~  mood as very good [Yes] : Yes [2 - 4 times a month (2 pts)] : 2-4 times a month (2 points) [Never (0 pts)] : Never (0 points) [No] : In the past 12 months have you used drugs other than those required for medical reasons? No [No falls in past year] : Patient reported no falls in the past year [0] : 2) Feeling down, depressed, or hopeless: Not at all (0) [PHQ-2 Negative - No further assessment needed] : PHQ-2 Negative - No further assessment needed [Patient reported mammogram was abnormal] : Patient reported mammogram was abnormal [Patient reported bone density results were normal] : Patient reported bone density results were normal [Patient reported colonoscopy was normal] : Patient reported colonoscopy was normal [None] : None [With Significant Other] : lives with significant other [Retired] : retired [] :  [Feels Safe at Home] : Feels safe at home [Designated Healthcare Proxy] : Designated healthcare proxy [Name: ___] : Health Care Proxy's Name: [unfilled]  [Relationship: ___] : Relationship: [unfilled] [Never] : Never [de-identified] : socially [Audit-CScore] : 2 [de-identified] : balanced diet [QSA4Qecoo] : 0 [Change in mental status noted] : No change in mental status noted [Language] : denies difficulty with language [Behavior] : denies difficulty with behavior [Learning/Retaining New Information] : denies difficulty learning/retaining new information [Handling Complex Tasks] : denies difficulty handling complex tasks [Reasoning] : denies difficulty with reasoning [Spatial Ability and Orientation] : denies difficulty with spatial ability and orientation [MammogramDate] : 10/20/2022 [MammogramComments] : left breast calcification [BoneDensityDate] : 4/6/2023 [ColonoscopyDate] : 12/4/2018 [ColonoscopyComments] : colonoscopy in 10 years [FreeTextEntry2] : retired  [AdvancecareDate] : 7/5/2023

## 2023-07-05 NOTE — ASSESSMENT
[FreeTextEntry1] : patient with appropriate preventative UTD \par no concerns on exam \par age appropriate counseling given\par \par \par Head pressure\par -An association with anastrozole has been made\par -Imaging given the lack of secondary symptoms it is unlikely to show any specific diagnosis\par -Recommend monitoring with follow-up with Dr. Ferguson regarding the anastrozole\par \par Vaccines up-to-date but the patient notes that she had an pneumococcal 23 prior to age 65.  Had a 13 after age 65.  Since then no other pneumonia vaccines.  Given current guidelines she would need to consider getting either another pneumococcal 23 or consider a pneumococcal 20\par \par

## 2023-07-05 NOTE — HISTORY OF PRESENT ILLNESS
[FreeTextEntry1] : new patient [de-identified] : 72 year of age F presents to establish care.\par \par headaches- mentions having it in lower part of head. Started after taking anastrozole. Pain went away for 2 weeks after temporarily stopping medication. No changes in vision, numbness or tingling. Associated with stiff neck. Located in occipital region. \par \par  received Tdap 1 and half ago. She also received Tdap 1 year and half ago. Up to date with shingles vaccine. Recieved pneumococcal vaccine before 65 years of age.  \par \par cholesterol values in May 2023- HDL was 66, LDL was 104,  Total cholesterol was 195. \par \par \par

## 2023-10-02 ENCOUNTER — RESULT REVIEW (OUTPATIENT)
Age: 73
End: 2023-10-02

## 2023-10-02 ENCOUNTER — APPOINTMENT (OUTPATIENT)
Dept: HEMATOLOGY ONCOLOGY | Facility: CLINIC | Age: 73
End: 2023-10-02
Payer: MEDICARE

## 2023-10-02 VITALS
OXYGEN SATURATION: 98 % | HEIGHT: 64 IN | HEART RATE: 74 BPM | WEIGHT: 177.13 LBS | SYSTOLIC BLOOD PRESSURE: 156 MMHG | DIASTOLIC BLOOD PRESSURE: 85 MMHG | TEMPERATURE: 98 F | BODY MASS INDEX: 30.24 KG/M2 | RESPIRATION RATE: 98 BRPM

## 2023-10-02 DIAGNOSIS — M19.90 UNSPECIFIED OSTEOARTHRITIS, UNSPECIFIED SITE: ICD-10-CM

## 2023-10-02 DIAGNOSIS — I10 ESSENTIAL (PRIMARY) HYPERTENSION: ICD-10-CM

## 2023-10-02 DIAGNOSIS — K57.92 DIVERTICULITIS OF INTESTINE, PART UNSPECIFIED, W/OUT PERFORATION OR ABSCESS W/OUT BLEEDING: ICD-10-CM

## 2023-10-02 PROCEDURE — 36415 COLL VENOUS BLD VENIPUNCTURE: CPT

## 2023-10-02 PROCEDURE — 99213 OFFICE O/P EST LOW 20 MIN: CPT | Mod: 25

## 2023-11-07 ENCOUNTER — APPOINTMENT (OUTPATIENT)
Dept: BREAST CENTER | Facility: CLINIC | Age: 73
End: 2023-11-07
Payer: MEDICARE

## 2023-11-07 VITALS
HEIGHT: 64 IN | BODY MASS INDEX: 30.22 KG/M2 | SYSTOLIC BLOOD PRESSURE: 152 MMHG | HEART RATE: 76 BPM | DIASTOLIC BLOOD PRESSURE: 83 MMHG | WEIGHT: 177 LBS

## 2023-11-07 PROCEDURE — 99213 OFFICE O/P EST LOW 20 MIN: CPT

## 2023-11-13 ENCOUNTER — NON-APPOINTMENT (OUTPATIENT)
Age: 73
End: 2023-11-13

## 2024-01-08 RX ORDER — EXEMESTANE 25 MG/1
25 TABLET, FILM COATED ORAL DAILY
Qty: 90 | Refills: 2 | Status: DISCONTINUED | COMMUNITY
Start: 2023-11-13 | End: 2024-01-08

## 2024-01-08 RX ORDER — ANASTROZOLE TABLETS 1 MG/1
1 TABLET ORAL DAILY
Qty: 90 | Refills: 3 | Status: ACTIVE | COMMUNITY
Start: 2023-01-24 | End: 1900-01-01

## 2024-03-08 ENCOUNTER — NON-APPOINTMENT (OUTPATIENT)
Age: 74
End: 2024-03-08

## 2024-04-18 ENCOUNTER — APPOINTMENT (OUTPATIENT)
Dept: HEMATOLOGY ONCOLOGY | Facility: CLINIC | Age: 74
End: 2024-04-18
Payer: MEDICARE

## 2024-04-18 ENCOUNTER — RESULT REVIEW (OUTPATIENT)
Age: 74
End: 2024-04-18

## 2024-04-18 VITALS
RESPIRATION RATE: 16 BRPM | BODY MASS INDEX: 30.73 KG/M2 | DIASTOLIC BLOOD PRESSURE: 88 MMHG | TEMPERATURE: 97.1 F | WEIGHT: 180 LBS | SYSTOLIC BLOOD PRESSURE: 152 MMHG | OXYGEN SATURATION: 98 % | HEIGHT: 64 IN | HEART RATE: 74 BPM

## 2024-04-18 DIAGNOSIS — Z78.0 OTHER SPECIFIED DISORDERS OF BONE DENSITY AND STRUCTURE, UNSPECIFIED SITE: ICD-10-CM

## 2024-04-18 DIAGNOSIS — M85.80 OTHER SPECIFIED DISORDERS OF BONE DENSITY AND STRUCTURE, UNSPECIFIED SITE: ICD-10-CM

## 2024-04-18 DIAGNOSIS — M79.10 MYALGIA, UNSPECIFIED SITE: ICD-10-CM

## 2024-04-18 PROCEDURE — 36415 COLL VENOUS BLD VENIPUNCTURE: CPT

## 2024-04-18 PROCEDURE — G2211 COMPLEX E/M VISIT ADD ON: CPT

## 2024-04-18 PROCEDURE — 99214 OFFICE O/P EST MOD 30 MIN: CPT

## 2024-04-18 NOTE — HISTORY OF PRESENT ILLNESS
[de-identified] : 72 year old female presents today for initial consultation of DCIS, referred by Josefina.  The patient underwent screening mammogram and ultrasound for density on 10/20/2022 at [facility]. Mammographic findings showed a cluster of indeterminate coarse calcifications in the left upper slightly medial breast. A stereotactic biopsy was recommended. Ultrasound showed no suspicious findings. The patient underwent a left 12:00 on 2022. Pathology findings showed low to intermediate grade DCIS, ER moderately to strongly positive at >90%, TX moderate to strongly positive at 5-10%.  An MRI was recommended, however the patient was unable to lay prone for the procedure.  \par  \par  She underwent a left partial mastectomy on 2022, pathology showed 10mm DCIS intermediate grade, focal involvement of inferior margin and 1.5mm margin anterior and medial, rest >4mm, ER positive (%, TX positive 5%.  She had re-excision of margins 22 anterior, medial, inferior which were all negative.\par  \par  She met with Dr. Wadsworth in consultation to discuss adjuvant radiation to the left breast to control local control and decrease her risk of a perinvasive or invasive recurrence, they obtained a DCISion RT.  \par  \par  Last BMD- [facility] - unsure of results \par  \par  BREAST CANCER RISK FACTORS\par  Menarche: 12\par  Date of LMP:\par  Menopause:49\par  Grav: 2 Para: 2\par  Age at first live birth: 27\par  Nursed: no\par  Hysterectomy: Y, partial in  due to fibroids\par  Oophorectomy: N\par  OCP: yes 5y\par  HRT: Vagifem x3-4 years\par  Last pap/pelvic exam:  WNL\par  Related family history: SIster Breast CA age 55, alive and well, mother with pancreatic cancer diagnosed  at 81\par  Ashkenazi: no\par  Genetic testing- negative?  \par  \par  \par   [de-identified] : Pt presents for follow up of breast cancer-she is on anastrozole Patient states that she has been having generalized body aches and pains.

## 2024-04-18 NOTE — ASSESSMENT
[With Patient/Caregiver] : With Patient/Caregiver [Designated Health Care Proxy] : Designated Health Care Proxy [Name: ___] : Name: [unfilled] [Relationship: ___] : Relationship: [unfilled] [FreeTextEntry1] : 73-year-old female referred for evaluation of DCIS diagnosed after mammogram on 10/20/2022 showed a cluster of indeterminate coarse calcifications in the left upper slightly medial breast. Pathology findings showed low to intermediate grade DCIS, ER moderately to strongly positive at >90%, UT moderate to strongly positive at 5-10%.  An MRI was recommended, however the patient was unable to lay prone for the procedure.    She underwent a left partial mastectomy on 12/8/2022, pathology showed  # left breast 10mm DCIS intermediate grade, focal involvement of inferior margin and 1.5mm margin anterior and medial, rest >4mm, ER positive (%, UT positive 5%.  She had re-excision of margins 12/20/22 anterior, medial, inferior which were all negative.  # right breast - benign  We reviewed pathology report, prognostic and therapeutic implications of receptor status, indication for risk reduction treatment with either SERM or AI.  Side effects and monitoring parameters were reviewed with patient. She was offered anastrozole- began and taking AD  #occasional headaches and drumming in the ear- offered brain MRI, however patient declines at this time, stating improving, and if worsens will call office.  Anastrozole started Feb 2023 and patient attributes HA to anastrozole. Increased pain in joints and muscle, very symptomatic.  Change to tamoxifen low dose- 10 mg - start every other day. s/p hysterectomy.   # Osteopenia last bone density 4/23 T-score -1.1 Risk factors: postmenopausal, AI Recommended: 1. Vitamin D 2. Calcium supplement 500mg 3. Weight bearing exercises    to return in 6 months reg NO markers   [AdvancecareDate] : 04/18/2024

## 2024-04-18 NOTE — REVIEW OF SYSTEMS
[Negative] : Allergic/Immunologic [Joint Pain] : joint pain [Joint Stiffness] : joint stiffness [Muscle Pain] : muscle pain [Muscle Weakness] : muscle weakness [FreeTextEntry9] : Arthritis, b/l shoulders

## 2024-04-18 NOTE — PHYSICAL EXAM
[Fully active, able to carry on all pre-disease performance without restriction] : Status 0 - Fully active, able to carry on all pre-disease performance without restriction [Normal] : affect appropriate [de-identified] : b/l healed scars

## 2024-06-18 ENCOUNTER — APPOINTMENT (OUTPATIENT)
Dept: BREAST CENTER | Facility: CLINIC | Age: 74
End: 2024-06-18
Payer: MEDICARE

## 2024-06-18 VITALS
BODY MASS INDEX: 29.88 KG/M2 | WEIGHT: 175 LBS | HEIGHT: 64 IN | OXYGEN SATURATION: 98 % | SYSTOLIC BLOOD PRESSURE: 156 MMHG | DIASTOLIC BLOOD PRESSURE: 80 MMHG | HEART RATE: 74 BPM

## 2024-06-18 DIAGNOSIS — R92.30 DENSE BREASTS, UNSPECIFIED: ICD-10-CM

## 2024-06-18 DIAGNOSIS — D05.12 INTRADUCTAL CARCINOMA IN SITU OF LEFT BREAST: ICD-10-CM

## 2024-06-18 DIAGNOSIS — Z78.9 OTHER SPECIFIED HEALTH STATUS: ICD-10-CM

## 2024-06-18 DIAGNOSIS — Z80.3 FAMILY HISTORY OF MALIGNANT NEOPLASM OF BREAST: ICD-10-CM

## 2024-06-18 DIAGNOSIS — Z12.31 ENCOUNTER FOR SCREENING MAMMOGRAM FOR MALIGNANT NEOPLASM OF BREAST: ICD-10-CM

## 2024-06-18 PROCEDURE — 99213 OFFICE O/P EST LOW 20 MIN: CPT

## 2024-06-18 NOTE — ASSESSMENT
[FreeTextEntry1] : Ductal carcinoma in situ (DCIS) of left breast (233.0)  cont AI And surveillance per Dr. Freguson  rec vitamin e oil and lotion daily plan mg/sono 10/2024  f/u 6 months  She knows to call or return sooner should any concerns or questions arise.

## 2024-06-18 NOTE — CONSULT LETTER
[Dear  ___] : Dear  [unfilled], [Courtesy Letter:] : I had the pleasure of seeing your patient, [unfilled], in my office today. [Please see my note below.] : Please see my note below. [Consult Closing:] : Thank you very much for allowing me to participate in the care of this patient.  If you have any questions, please do not hesitate to contact me. [Sincerely,] : Sincerely, [DrLyubov  ___] : Dr. SALINAS [FreeTextEntry3] : An Rocha [place] [address]

## 2024-06-18 NOTE — HISTORY OF PRESENT ILLNESS
[FreeTextEntry1] : This is a 73 year old female referred by Dr. Richardson for newly diagnosed ductal carcinoma in situ of the left breast.  The patient underwent screening mammogram and ultrasound for density on 10/20/2022 at [facility]. Mammographic findings showed a cluster of indeterminate coarse calcifications in the left upper slightly medial breast. A stereotactic biopsy was recommended. Ultrasound showed no suspicious findings. The patient underwent a left 12:00 (U-shaped clip) on 11/1/2022. Pathology findings showed low to intermediate grade DCIS, ER moderately to strongly positive at >90%, MS moderate to strongly positive at 5-10%. The patient states she had one breast biopsy at [facility] 15 years ago on the left side. Which was ultrasound and no clip was left. She also saw Dr. Ibrahim about 10 years ago and had a left breast cyst drained. She presents with her  for consultation. She is retried from accounting/bookkeeping.  She is s/p left partial mastectomy on 12/8/2022 pathology showed 10mm DCIS intermediate grade, focal involvment of inferior marging and 1.5mm margin anterior and medial, rest >4mm, ER positive (%, MS positive 5%. Doing well post op. She had post op rash. She had re-excision of margins 12/20/22 anterior, medial, inferior which were all negative. She saw Dr. Wadsworth and Marty. DCision RT was low and RTx is being omitted. She started anastrozole with 2/1/2023 then switched to exemestane then had difficulty with sleeping then tried tamoxifen low dose then started anastrozole again mid 5/2024.  She does SBE.  She has not noticed a change in her breast or a breast lump. She has not noticed a change in her nipple or nipple area. She states her right nipple has been creased for as long as she can recall. She has not noticed a change in the skin of the breast. She is not experiencing nipple discharge. She is not experiencing breast pain. She has sensitivity when the shower water hits the left breast. She has not noticed a lump or lymph node under the armpit.   BREAST CANCER RISK FACTORS Menarche: 12 Date of LMP:1996 Menopause:Post, age unknown Grav: 2      Para:  2 Age at first live birth: 27 Nursed: no Hysterectomy: Y 1996 Oophorectomy: N OCP:  yes 5y HRT: no Last pap/pelvic exam: 4/4/2023 WNL Related family history:  SIster Breast CA age 55 Ashkenazi: no Mastery risk assessment:  n/a BRCA testing: yes sister tested negative Bra size:  40B  Last mammogram:  10/26/2023          Location:[facility] reviewed.                                 Images reviewed. Results: Birads 1 No evidence of malklignancy.  Last ultrasound:  10/26/2023             Location: [facility] reviewed.                                 Images reviewed.  Results: Birads 1 No suspicious findings.  Last MRI:  never                                           Location: Report reviewed.

## 2024-06-18 NOTE — PHYSICAL EXAM
[Normocephalic] : normocephalic [Atraumatic] : atraumatic [Supple] : supple [No Supraclavicular Adenopathy] : no supraclavicular adenopathy [Examined in the supine and seated position] : examined in the supine and seated position [Symmetrical] : symmetrical [No dominant masses] : no dominant masses in right breast  [No dominant masses] : no dominant masses left breast [No Nipple Retraction] : no left nipple retraction [No Nipple Discharge] : no left nipple discharge [No Axillary Lymphadenopathy] : no left axillary lymphadenopathy [No Edema] : no edema [No Rashes] : no rashes [No Ulceration] : no ulceration [de-identified] : there is a slight contour deformity in the upper quadrant with defect as expected

## 2024-07-16 ENCOUNTER — APPOINTMENT (OUTPATIENT)
Dept: FAMILY MEDICINE | Facility: CLINIC | Age: 74
End: 2024-07-16
Payer: MEDICARE

## 2024-07-16 DIAGNOSIS — I10 ESSENTIAL (PRIMARY) HYPERTENSION: ICD-10-CM

## 2024-07-16 DIAGNOSIS — Z00.00 ENCOUNTER FOR GENERAL ADULT MEDICAL EXAMINATION W/OUT ABNORMAL FINDINGS: ICD-10-CM

## 2024-07-16 DIAGNOSIS — E78.5 HYPERLIPIDEMIA, UNSPECIFIED: ICD-10-CM

## 2024-07-16 PROCEDURE — 36415 COLL VENOUS BLD VENIPUNCTURE: CPT

## 2024-07-16 PROCEDURE — G0439: CPT

## 2024-07-23 ENCOUNTER — APPOINTMENT (OUTPATIENT)
Dept: HEMATOLOGY ONCOLOGY | Facility: CLINIC | Age: 74
End: 2024-07-23
Payer: MEDICARE

## 2024-07-23 ENCOUNTER — RESULT REVIEW (OUTPATIENT)
Age: 74
End: 2024-07-23

## 2024-07-23 VITALS
HEIGHT: 64 IN | BODY MASS INDEX: 30.75 KG/M2 | RESPIRATION RATE: 16 BRPM | WEIGHT: 180.13 LBS | HEART RATE: 80 BPM | OXYGEN SATURATION: 98 % | SYSTOLIC BLOOD PRESSURE: 164 MMHG | TEMPERATURE: 97.4 F | DIASTOLIC BLOOD PRESSURE: 88 MMHG

## 2024-07-23 DIAGNOSIS — Z78.0 OTHER SPECIFIED DISORDERS OF BONE DENSITY AND STRUCTURE, UNSPECIFIED SITE: ICD-10-CM

## 2024-07-23 DIAGNOSIS — D05.12 INTRADUCTAL CARCINOMA IN SITU OF LEFT BREAST: ICD-10-CM

## 2024-07-23 DIAGNOSIS — M85.80 OTHER SPECIFIED DISORDERS OF BONE DENSITY AND STRUCTURE, UNSPECIFIED SITE: ICD-10-CM

## 2024-07-23 DIAGNOSIS — R92.30 DENSE BREASTS, UNSPECIFIED: ICD-10-CM

## 2024-07-23 PROCEDURE — 36415 COLL VENOUS BLD VENIPUNCTURE: CPT

## 2024-07-23 PROCEDURE — 99213 OFFICE O/P EST LOW 20 MIN: CPT

## 2024-07-23 NOTE — PHYSICAL EXAM
[Fully active, able to carry on all pre-disease performance without restriction] : Status 0 - Fully active, able to carry on all pre-disease performance without restriction [Normal] : affect appropriate [de-identified] : b/l healed scars

## 2024-07-23 NOTE — HISTORY OF PRESENT ILLNESS
[de-identified] : 72 year old female presents today for initial consultation of DCIS, referred by Josefina.  The patient underwent screening mammogram and ultrasound for density on 10/20/2022 at [facility]. Mammographic findings showed a cluster of indeterminate coarse calcifications in the left upper slightly medial breast. A stereotactic biopsy was recommended. Ultrasound showed no suspicious findings. The patient underwent a left 12:00 on 2022. Pathology findings showed low to intermediate grade DCIS, ER moderately to strongly positive at >90%, FL moderate to strongly positive at 5-10%.  An MRI was recommended, however the patient was unable to lay prone for the procedure.  \par  \par  She underwent a left partial mastectomy on 2022, pathology showed 10mm DCIS intermediate grade, focal involvement of inferior margin and 1.5mm margin anterior and medial, rest >4mm, ER positive (%, FL positive 5%.  She had re-excision of margins 22 anterior, medial, inferior which were all negative.\par  \par  She met with Dr. Wadsworth in consultation to discuss adjuvant radiation to the left breast to control local control and decrease her risk of a perinvasive or invasive recurrence, they obtained a DCISion RT.  \par  \par  Last BMD- [facility] - unsure of results \par  \par  BREAST CANCER RISK FACTORS\par  Menarche: 12\par  Date of LMP:\par  Menopause:49\par  Grav: 2 Para: 2\par  Age at first live birth: 27\par  Nursed: no\par  Hysterectomy: Y, partial in  due to fibroids\par  Oophorectomy: N\par  OCP: yes 5y\par  HRT: Vagifem x3-4 years\par  Last pap/pelvic exam:  WNL\par  Related family history: SIster Breast CA age 55, alive and well, mother with pancreatic cancer diagnosed  at 81\par  Ashkenazi: no\par  Genetic testing- negative?  \par  \par  \par   [de-identified] : Pt presents for follow up of breast cancer-she is on anastrozole Patient was having dificulty walking and severe pain in her right leg in May, 2024 she followed up with , orthopedic doctor which they originally diagnosed it as a bursitis. She followed up on June 7th, 2024 which they thought it was a meniscus tear and finally she underwent an MRI of her leg which revealed small nondisplaced fracture medial tibial plateau, Mild degenerative changes greatest in the patellofemoral compartment. She is using a cane at the moment and she is following up with the orthopedist this Thursday.  On

## 2024-07-23 NOTE — END OF VISIT
Pulmonary Rehabilitation Plan of Care   30 Day Reassessment      Today's date: 2022   # of Exercise Sessions Completed: 5  Patient name: Rasheeda Bah      : 1958  Age: 61 y o  MRN: [id number]  Referring Physician: Lesley Bourgeois*  Pulmonologist: Estelle Neri MD  Provider: Elis  Clinician: Frederic Palmer, VAL    Dx:   Encounter Diagnosis   Name Primary?  Chronic obstructive pulmonary disease, unspecified COPD type ([facility] [address] ) Yes     Date of onset: 2021      SUMMARY OF PROGRESS:  Ms Clementine Horvath is being evaluated  for 30 days progress in  pulmonary rehab  To date she has completed 5 exercise sessions  She is being referred with a  diagnosis of COPD/ Asthma overlap  Ms Clementine Horvath wear 4 lpm oxygen  and also CPAP HS for WILMA  She has a history of Type 2 Daibetes but does not take daily sugars  Ms Clementine Horvath also has a history of A fib, pulmonary hypertension and CHF  She reports some issues with her knees and back as well  Ms Joy's  goals for entering the program are to be able to walk to the mailbox and back which involves and incline as well as to go to play pool with friends  She also hopes to get into better shape and increase endurance  An ETT test was done today where the NuStep was used  Ms Clementine Horvath was not able to ambulate well  Her baseline Sa02 on 4lpm resting was 96% and HR 78 BPM with SOB 3/10  She did have to rest for about 30 seconds during minute 3   Ms ;s lowest Sao2 was 88% with peak HR 90 BPM and SOB 5/10 with REP 2/10  Ms Clementine Horvath has been able to exercise 30 per exercise session by working to increase her time  Her baseline Sa02 on 4 lpm is 91%, HR 87 bpm, /70,   Her HR peaks during exercise in the mid 90's with SOB 3/10 and RPE 4/10  Ms Clementine Horvath  is only able to do the Nustep at this point do to physical limitations and pain  We will work to increase her intensity and duration on the Nustep and try to  Work up to being able to use other pieces of equipment   Ms  Clementine Horvath has attended a few formal education session during class and seems to be benefiting from them  Ms Clementine Horvath has agreed to continue pulmonary rehab and will attend 2x/ week  We will monitor per protocol and report progress accordingly  Medication compliance: Yes   Comments: Pt reports to be compliant with medications  Fall Risk: Moderate   Comments: Patient uses walking assist device (walker/cane/rollator)    Smoking: Former user    RPD at Rest: 3/10  RPD with Exercise:  5/10    Assessment of progression of lung disease and functional status:  CAT:   Shortness of breath questionnaire: 91/120      EXERCISE ASSESSMENT and PLAN    Current Exercise Program in Rehab:       Frequency: 2 days/week        Minutes: 30         METS: 2 0              SpO2: greater than 90%         RPD: 3-5                      HR: no more than 30 bpm greater than resting HR   RPE: 4-5         Modalities: NuStep      Exercise Progression 30 Day Goals :    Frequency: 2 days/week        Minutes: 35         METS: 2 0-2 4              SpO2: greater than 90%              RPD: 3-5                      HR: no more than 30 BPM greater than resting HR   RPE: 4-5        Modalities: UBE, NuStep and Room walking     Strength trainin-3 days / week  12-15 repetitions  1-2 sets per modality   will add in per patient tolerance    Currenlty she is unable to progress to arms and upper body due to reported pain    Modalities: Lateral Raise, Arm Curl, Front Raises and Shoulder Shrugs    Oxygen Needs: supplemental O2 via nasal cannula @ 4L/min at rest  Oxygen Goal: Maintain SpO2>90% during exercise    Home Exercise: none  Education: pursed lipped breathing, diaphragmatic breathing, fall risk using nasal canula tubing, relaxation breathing, home exercise, benefits of exercise for pulmonary disease and RPE scale    Goals: reduced score on  USCD Shortness of Breath Questionnaire, Improved 6MW distance by 10%, reduced dyspnea during exercise (0-310), improved exercise tolerance (max METs tolerated in pulmonary rehab), SpO2 >90% during exercise, improved DUKE activity score, reduced score on CAT, reduced number of COPD exacerbations, reduced RPD at rest, attend pulmonary rehab regularly and decrease sitting time at home  Progressing:  Reviewed Pt goals and determined plan of care    Plan: Titrate supplemental oxygen as needed to maintain SpO2>90% with exercise    Readiness to change: Pre-Contemplation:   (Not yet acknowledging that there is a problem behavior that needs to change)      NUTRITION ASSESSMENT AND PLAN    Weight control:    Starting weight: 290   Current weight:   290 (per patient )     Diabetes: type 2 daibetic- patient did not knw sugar number today    Goals:choose lean meat (93-95%), eliminate processed meats, reduce portion sizes of meat to 3oz or less, increase intake of fish, shellfish, cook without added fat or use vegetable oil/spray, increase intake of meatless meals, use low fat dairy, reduce cheese intake or use reduced-fat, eat 3 or more servings of whole grains a day, Eat 4-5 cups of fruits and vegetables daily, use olive or canola oil in baking, choose low sodium processed foods, eliminate butter, use fat-free dressings/degroot or seldom use, choose healthy snacks: light popcorn, plain pretzels, Increase intake of nuts and seeds and seldom eat or choose low fat ice-cream, fruit juice bars or frozen yogurt   Education: heart healthy eating  hydration  Progressing:Reviewed Pt goals and determined plan of care  Plan: switch to low fat cheeses, replace butter with soft spreads made with olive oil, canola or yogurt, replace refined grain bread with whole grain bread, replace unhealthy snacks with fruits & vegs, reduce portion sizes, reduce red meat 1x/wk, switch to skim or 1% milk, eat fewer desserts and sweets, avoid processed foods, remove salt shaker from table, use salt substitute like Mrs   Dash, increase utilization of fresh or dried herbs, eat more home cooked meals and eat out less often, will try new grains like brown rice, quinoa, farro, will replace sugar sweetened cereals with whole grain or oatmeal, monitor home blood glucose, reduce alcohol intake, drink more water and learn how to read food labels  Readiness to change: Pre-Contemplation:   (Not yet acknowledging that there is a problem behavior that needs to change)      PSYCHOSOCIAL ASSESSMENT AND PLAN    Emotional:  Depression assessment:  PHQ-9 = 15-19 = Moderately Severe Depression            Anxiety measure:  HALLE-7 = 0-4  = Not anxious  Self-reported stress level: 5   Social support: Very Good    Goals:  improved [facility] QOL < 27, PHQ-9 - reduced severity by one level, Physical Fitness in [facility] Score < 3, Daily Activity in [facility] Score < 3, Social Activities in [facility] Score < 3 and Overall Health in [facility] Score < 3  Education: signs/sxs of depression    Progressing:Reviewed Pt goals and determined plan of care  Plan: PHQ-9 >5 will refer to MD and Refer to [employer] to change: Preparation:  (Getting ready to change)       OTHER CORE COMPONENTS     Tobacco:   Social History     Tobacco Use   Smoking Status Former Smoker    Packs/day: 0 25    Years: 29 00    Pack years: 7 25    Types: Cigarettes    Start date: 200    Quit date: 12/10/2019    Years since quittin 1   Smokeless Tobacco Never Used       Tobacco Use Intervention: Referral to tobacco expert:   N/A: Pt has a remote history of smoking    Blood pressure:    Restin/70   Exercise: 132/82    Goals: consistent BP < 130/80, reduced dietary sodium <2300mg, moderate intensity exercise >150 mins/wk and medication compliance  Education:  pathophysiology of pulmonary disease, preventing infections, relapse education, control coughing, inspiratory muscle training, environmental triggers, nebulizer use, bronchodilators, bronchial hygiene and traveling with pulmonary disease  Progressing:Reviewed Pt goals and determined plan of care  Plan: avoid places with second hand smoke, Avoid Processed foods, engage in regular exercise, eliminate salt shaker at the table, use salt substitutes, check labels for sodium content and monitor home BP  Readiness to change: Preparation:  (Getting ready to change) [Time Spent: ___ minutes] : I have spent [unfilled] minutes of time on the encounter.

## 2024-07-23 NOTE — HISTORY OF PRESENT ILLNESS
[de-identified] : 72 year old female presents today for initial consultation of DCIS, referred by Josefina.  The patient underwent screening mammogram and ultrasound for density on 10/20/2022 at [facility]. Mammographic findings showed a cluster of indeterminate coarse calcifications in the left upper slightly medial breast. A stereotactic biopsy was recommended. Ultrasound showed no suspicious findings. The patient underwent a left 12:00 on 2022. Pathology findings showed low to intermediate grade DCIS, ER moderately to strongly positive at >90%, IA moderate to strongly positive at 5-10%.  An MRI was recommended, however the patient was unable to lay prone for the procedure.  \par  \par  She underwent a left partial mastectomy on 2022, pathology showed 10mm DCIS intermediate grade, focal involvement of inferior margin and 1.5mm margin anterior and medial, rest >4mm, ER positive (%, IA positive 5%.  She had re-excision of margins 22 anterior, medial, inferior which were all negative.\par  \par  She met with Dr. Wadsworth in consultation to discuss adjuvant radiation to the left breast to control local control and decrease her risk of a perinvasive or invasive recurrence, they obtained a DCISion RT.  \par  \par  Last BMD- [facility] - unsure of results \par  \par  BREAST CANCER RISK FACTORS\par  Menarche: 12\par  Date of LMP:\par  Menopause:49\par  Grav: 2 Para: 2\par  Age at first live birth: 27\par  Nursed: no\par  Hysterectomy: Y, partial in  due to fibroids\par  Oophorectomy: N\par  OCP: yes 5y\par  HRT: Vagifem x3-4 years\par  Last pap/pelvic exam:  WNL\par  Related family history: SIster Breast CA age 55, alive and well, mother with pancreatic cancer diagnosed  at 81\par  Ashkenazi: no\par  Genetic testing- negative?  \par  \par  \par   [de-identified] : Pt presents for follow up of breast cancer-she is on anastrozole Patient was having dificulty walking and severe pain in her right leg in May, 2024 she followed up with , orthopedic doctor which they originally diagnosed it as a bursitis. She followed up on June 7th, 2024 which they thought it was a meniscus tear and finally she underwent an MRI of her leg which revealed small nondisplaced fracture medial tibial plateau, Mild degenerative changes greatest in the patellofemoral compartment. She is using a cane at the moment and she is following up with the orthopedist this Thursday.  On

## 2024-07-23 NOTE — ASSESSMENT
[With Patient/Caregiver] : With Patient/Caregiver [Designated Health Care Proxy] : Designated Health Care Proxy [Name: ___] : Name: [unfilled] [Relationship: ___] : Relationship: [unfilled] [FreeTextEntry1] : 73-year-old female referred for evaluation of DCIS diagnosed after mammogram on 10/20/2022 showed a cluster of indeterminate coarse calcifications in the left upper slightly medial breast. Pathology findings showed low to intermediate grade DCIS, ER moderately to strongly positive at >90%, IL moderate to strongly positive at 5-10%.  An MRI was recommended, however the patient was unable to lay prone for the procedure.    She underwent a left partial mastectomy on 12/8/2022, pathology showed  # left breast 10mm DCIS intermediate grade, focal involvement of inferior margin and 1.5mm margin anterior and medial, rest >4mm, ER positive (%, IL positive 5%.  She had re-excision of margins 12/20/22 anterior, medial, inferior which were all negative.  # right breast biopsy - benign  We reviewed pathology report, prognostic and therapeutic implications of receptor status, indication for risk reduction treatment with either SERM or AI.  Side effects and monitoring parameters were reviewed with patient. She was offered anastrozole- began and taking AD  #occasional headaches and drumming in the ear- offered brain MRI, however patient declines at this time, stating improving, and if worsens will call office.  Anastrozole started Feb 2023 and patient attributes HA to anastrozole. Increased pain in joints and muscle, very symptomatic.  Didnt change to tamoxifen, on anastrozole  s/p hysterectomy.   # Osteopenia last bone density 4/23 T-score -1.1 Risk factors: postmenopausal, AI Recommended: 1. Vitamin D 2. Calcium supplement 500mg 3. Weight bearing exercises  # Right medial tibial plateau - ambulates with cane  to return in 6 months reg NO markers   [AdvancecareDate] : 04/18/2024

## 2024-07-23 NOTE — PHYSICAL EXAM
[Fully active, able to carry on all pre-disease performance without restriction] : Status 0 - Fully active, able to carry on all pre-disease performance without restriction [Normal] : affect appropriate [de-identified] : b/l healed scars

## 2024-07-23 NOTE — ASSESSMENT
[With Patient/Caregiver] : With Patient/Caregiver [Designated Health Care Proxy] : Designated Health Care Proxy [Name: ___] : Name: [unfilled] [Relationship: ___] : Relationship: [unfilled] [FreeTextEntry1] : 73-year-old female referred for evaluation of DCIS diagnosed after mammogram on 10/20/2022 showed a cluster of indeterminate coarse calcifications in the left upper slightly medial breast. Pathology findings showed low to intermediate grade DCIS, ER moderately to strongly positive at >90%, OK moderate to strongly positive at 5-10%.  An MRI was recommended, however the patient was unable to lay prone for the procedure.    She underwent a left partial mastectomy on 12/8/2022, pathology showed  # left breast 10mm DCIS intermediate grade, focal involvement of inferior margin and 1.5mm margin anterior and medial, rest >4mm, ER positive (%, OK positive 5%.  She had re-excision of margins 12/20/22 anterior, medial, inferior which were all negative.  # right breast biopsy - benign  We reviewed pathology report, prognostic and therapeutic implications of receptor status, indication for risk reduction treatment with either SERM or AI.  Side effects and monitoring parameters were reviewed with patient. She was offered anastrozole- began and taking AD  #occasional headaches and drumming in the ear- offered brain MRI, however patient declines at this time, stating improving, and if worsens will call office.  Anastrozole started Feb 2023 and patient attributes HA to anastrozole. Increased pain in joints and muscle, very symptomatic.  Didnt change to tamoxifen, on anastrozole  s/p hysterectomy.   # Osteopenia last bone density 4/23 T-score -1.1 Risk factors: postmenopausal, AI Recommended: 1. Vitamin D 2. Calcium supplement 500mg 3. Weight bearing exercises  # Right medial tibial plateau - ambulates with cane  to return in 6 months reg NO markers   [AdvancecareDate] : 04/18/2024

## 2024-07-23 NOTE — ASSESSMENT
[With Patient/Caregiver] : With Patient/Caregiver [Designated Health Care Proxy] : Designated Health Care Proxy [Name: ___] : Name: [unfilled] [Relationship: ___] : Relationship: [unfilled] [FreeTextEntry1] : 73-year-old female referred for evaluation of DCIS diagnosed after mammogram on 10/20/2022 showed a cluster of indeterminate coarse calcifications in the left upper slightly medial breast. Pathology findings showed low to intermediate grade DCIS, ER moderately to strongly positive at >90%, GA moderate to strongly positive at 5-10%.  An MRI was recommended, however the patient was unable to lay prone for the procedure.    She underwent a left partial mastectomy on 12/8/2022, pathology showed  # left breast 10mm DCIS intermediate grade, focal involvement of inferior margin and 1.5mm margin anterior and medial, rest >4mm, ER positive (%, GA positive 5%.  She had re-excision of margins 12/20/22 anterior, medial, inferior which were all negative.  # right breast biopsy - benign  We reviewed pathology report, prognostic and therapeutic implications of receptor status, indication for risk reduction treatment with either SERM or AI.  Side effects and monitoring parameters were reviewed with patient. She was offered anastrozole- began and taking AD  #occasional headaches and drumming in the ear- offered brain MRI, however patient declines at this time, stating improving, and if worsens will call office.  Anastrozole started Feb 2023 and patient attributes HA to anastrozole. Increased pain in joints and muscle, very symptomatic.  Didnt change to tamoxifen, on anastrozole  s/p hysterectomy.   # Osteopenia last bone density 4/23 T-score -1.1 Risk factors: postmenopausal, AI Recommended: 1. Vitamin D 2. Calcium supplement 500mg 3. Weight bearing exercises  # Right medial tibial plateau - ambulates with cane  to return in 6 months reg NO markers   [AdvancecareDate] : 04/18/2024

## 2024-07-23 NOTE — REVIEW OF SYSTEMS
[Joint Pain] : joint pain [Joint Stiffness] : joint stiffness [Muscle Pain] : muscle pain [Muscle Weakness] : muscle weakness [Negative] : Allergic/Immunologic [Difficulty Walking] : difficulty walking [FreeTextEntry9] : Arthritis, b/l shoulders [de-identified] : use cane

## 2024-07-23 NOTE — REVIEW OF SYSTEMS
[Joint Pain] : joint pain [Joint Stiffness] : joint stiffness [Muscle Pain] : muscle pain [Muscle Weakness] : muscle weakness [Negative] : Allergic/Immunologic [Difficulty Walking] : difficulty walking [FreeTextEntry9] : Arthritis, b/l shoulders [de-identified] : use cane

## 2024-07-23 NOTE — PHYSICAL EXAM
[Fully active, able to carry on all pre-disease performance without restriction] : Status 0 - Fully active, able to carry on all pre-disease performance without restriction [Normal] : affect appropriate [de-identified] : b/l healed scars

## 2024-07-23 NOTE — REVIEW OF SYSTEMS
[Joint Pain] : joint pain [Joint Stiffness] : joint stiffness [Muscle Pain] : muscle pain [Muscle Weakness] : muscle weakness [Negative] : Allergic/Immunologic [Difficulty Walking] : difficulty walking [FreeTextEntry9] : Arthritis, b/l shoulders [de-identified] : use cane

## 2024-07-23 NOTE — HISTORY OF PRESENT ILLNESS
[de-identified] : 72 year old female presents today for initial consultation of DCIS, referred by Josefina.  The patient underwent screening mammogram and ultrasound for density on 10/20/2022 at [facility]. Mammographic findings showed a cluster of indeterminate coarse calcifications in the left upper slightly medial breast. A stereotactic biopsy was recommended. Ultrasound showed no suspicious findings. The patient underwent a left 12:00 on 2022. Pathology findings showed low to intermediate grade DCIS, ER moderately to strongly positive at >90%, CT moderate to strongly positive at 5-10%.  An MRI was recommended, however the patient was unable to lay prone for the procedure.  \par  \par  She underwent a left partial mastectomy on 2022, pathology showed 10mm DCIS intermediate grade, focal involvement of inferior margin and 1.5mm margin anterior and medial, rest >4mm, ER positive (%, CT positive 5%.  She had re-excision of margins 22 anterior, medial, inferior which were all negative.\par  \par  She met with Dr. Wadsworth in consultation to discuss adjuvant radiation to the left breast to control local control and decrease her risk of a perinvasive or invasive recurrence, they obtained a DCISion RT.  \par  \par  Last BMD- [facility]/ [facility] - unsure of results \par  \par  BREAST CANCER RISK FACTORS\par  Menarche: 12\par  Date of LMP:\par  Menopause:49\par  Grav: 2 Para: 2\par  Age at first live birth: 27\par  Nursed: no\par  Hysterectomy: Y, partial in  due to fibroids\par  Oophorectomy: N\par  OCP: yes 5y\par  HRT: Vagifem x3-4 years\par  Last pap/pelvic exam:  WNL\par  Related family history: SIster Breast CA age 55, alive and well, mother with pancreatic cancer diagnosed  at 81\par  Ashkenazi: no\par  Genetic testing- negative?  \par  \par  \par   [de-identified] : Pt presents for follow up of breast cancer-she is on anastrozole Patient was having dificulty walking and severe pain in her right leg in May, 2024 she followed up with , orthopedic doctor which they originally diagnosed it as a bursitis. She followed up on June 7th, 2024 which they thought it was a meniscus tear and finally she underwent an MRI of her leg which revealed small nondisplaced fracture medial tibial plateau, Mild degenerative changes greatest in the patellofemoral compartment. She is using a cane at the moment and she is following up with the orthopedist this Thursday.  On

## 2024-10-29 ENCOUNTER — RESULT REVIEW (OUTPATIENT)
Age: 74
End: 2024-10-29

## 2025-01-21 ENCOUNTER — RESULT REVIEW (OUTPATIENT)
Age: 75
End: 2025-01-21

## 2025-01-21 ENCOUNTER — APPOINTMENT (OUTPATIENT)
Dept: HEMATOLOGY ONCOLOGY | Facility: CLINIC | Age: 75
End: 2025-01-21
Payer: MEDICARE

## 2025-01-21 VITALS
BODY MASS INDEX: 32.46 KG/M2 | WEIGHT: 190.13 LBS | DIASTOLIC BLOOD PRESSURE: 78 MMHG | HEART RATE: 81 BPM | HEIGHT: 64 IN | OXYGEN SATURATION: 97 % | RESPIRATION RATE: 16 BRPM | TEMPERATURE: 96.8 F | SYSTOLIC BLOOD PRESSURE: 156 MMHG

## 2025-01-21 DIAGNOSIS — Z78.0 OTHER SPECIFIED DISORDERS OF BONE DENSITY AND STRUCTURE, UNSPECIFIED SITE: ICD-10-CM

## 2025-01-21 DIAGNOSIS — M85.80 OTHER SPECIFIED DISORDERS OF BONE DENSITY AND STRUCTURE, UNSPECIFIED SITE: ICD-10-CM

## 2025-01-21 DIAGNOSIS — M19.90 UNSPECIFIED OSTEOARTHRITIS, UNSPECIFIED SITE: ICD-10-CM

## 2025-01-21 DIAGNOSIS — D05.12 INTRADUCTAL CARCINOMA IN SITU OF LEFT BREAST: ICD-10-CM

## 2025-01-21 PROCEDURE — 36415 COLL VENOUS BLD VENIPUNCTURE: CPT

## 2025-01-21 PROCEDURE — 99214 OFFICE O/P EST MOD 30 MIN: CPT

## 2025-01-28 DIAGNOSIS — R92.30 DENSE BREASTS, UNSPECIFIED: ICD-10-CM

## 2025-01-28 PROBLEM — R92.2 INCONCLUSIVE MAMMOGRAM: Status: ACTIVE | Noted: 2025-01-28

## 2025-01-28 PROBLEM — Z85.3 HISTORY OF BREAST CANCER: Status: ACTIVE | Noted: 2025-01-28

## 2025-02-04 ENCOUNTER — APPOINTMENT (OUTPATIENT)
Dept: BREAST CENTER | Facility: CLINIC | Age: 75
End: 2025-02-04
Payer: MEDICARE

## 2025-02-04 VITALS
WEIGHT: 180 LBS | HEIGHT: 64 IN | SYSTOLIC BLOOD PRESSURE: 159 MMHG | BODY MASS INDEX: 30.73 KG/M2 | HEART RATE: 78 BPM | DIASTOLIC BLOOD PRESSURE: 83 MMHG

## 2025-02-04 DIAGNOSIS — N60.81 OTHER BENIGN MAMMARY DYSPLASIAS OF RIGHT BREAST: ICD-10-CM

## 2025-02-04 DIAGNOSIS — Z85.3 PERSONAL HISTORY OF MALIGNANT NEOPLASM OF BREAST: ICD-10-CM

## 2025-02-04 DIAGNOSIS — Z80.0 FAMILY HISTORY OF MALIGNANT NEOPLASM OF DIGESTIVE ORGANS: ICD-10-CM

## 2025-02-04 DIAGNOSIS — Z78.9 OTHER SPECIFIED HEALTH STATUS: ICD-10-CM

## 2025-02-04 DIAGNOSIS — D05.12 INTRADUCTAL CARCINOMA IN SITU OF LEFT BREAST: ICD-10-CM

## 2025-02-04 DIAGNOSIS — R92.2 INCONCLUSIVE MAMMOGRAM: ICD-10-CM

## 2025-02-04 DIAGNOSIS — Z80.3 FAMILY HISTORY OF MALIGNANT NEOPLASM OF BREAST: ICD-10-CM

## 2025-02-04 PROCEDURE — 99213 OFFICE O/P EST LOW 20 MIN: CPT

## 2025-06-16 ENCOUNTER — RESULT REVIEW (OUTPATIENT)
Age: 75
End: 2025-06-16

## 2025-06-25 ENCOUNTER — NON-APPOINTMENT (OUTPATIENT)
Age: 75
End: 2025-06-25

## 2025-06-27 ENCOUNTER — APPOINTMENT (OUTPATIENT)
Dept: ORTHOPEDIC SURGERY | Facility: CLINIC | Age: 75
End: 2025-06-27
Payer: MEDICARE

## 2025-06-27 VITALS
SYSTOLIC BLOOD PRESSURE: 153 MMHG | OXYGEN SATURATION: 97 % | HEIGHT: 64 IN | HEART RATE: 82 BPM | WEIGHT: 180 LBS | BODY MASS INDEX: 30.73 KG/M2 | DIASTOLIC BLOOD PRESSURE: 71 MMHG

## 2025-06-27 PROCEDURE — 73564 X-RAY EXAM KNEE 4 OR MORE: CPT | Mod: RT

## 2025-06-27 PROCEDURE — 99204 OFFICE O/P NEW MOD 45 MIN: CPT

## 2025-07-14 ENCOUNTER — APPOINTMENT (OUTPATIENT)
Dept: HEMATOLOGY ONCOLOGY | Facility: CLINIC | Age: 75
End: 2025-07-14

## 2025-07-21 ENCOUNTER — APPOINTMENT (OUTPATIENT)
Age: 75
End: 2025-07-21
Payer: MEDICARE

## 2025-07-21 VITALS
SYSTOLIC BLOOD PRESSURE: 130 MMHG | OXYGEN SATURATION: 97 % | WEIGHT: 180 LBS | DIASTOLIC BLOOD PRESSURE: 70 MMHG | HEIGHT: 64 IN | BODY MASS INDEX: 30.73 KG/M2 | HEART RATE: 77 BPM

## 2025-07-21 DIAGNOSIS — I10 ESSENTIAL (PRIMARY) HYPERTENSION: ICD-10-CM

## 2025-07-21 DIAGNOSIS — Z00.00 ENCOUNTER FOR GENERAL ADULT MEDICAL EXAMINATION W/OUT ABNORMAL FINDINGS: ICD-10-CM

## 2025-07-21 DIAGNOSIS — E78.5 HYPERLIPIDEMIA, UNSPECIFIED: ICD-10-CM

## 2025-07-21 PROCEDURE — G0439: CPT

## 2025-07-21 PROCEDURE — 36415 COLL VENOUS BLD VENIPUNCTURE: CPT

## 2025-07-24 ENCOUNTER — APPOINTMENT (OUTPATIENT)
Dept: HEMATOLOGY ONCOLOGY | Facility: CLINIC | Age: 75
End: 2025-07-24
Payer: MEDICARE

## 2025-07-24 ENCOUNTER — RESULT REVIEW (OUTPATIENT)
Age: 75
End: 2025-07-24

## 2025-07-24 VITALS
TEMPERATURE: 97.1 F | WEIGHT: 181.25 LBS | SYSTOLIC BLOOD PRESSURE: 149 MMHG | HEIGHT: 64 IN | HEART RATE: 81 BPM | OXYGEN SATURATION: 99 % | BODY MASS INDEX: 30.94 KG/M2 | DIASTOLIC BLOOD PRESSURE: 78 MMHG | RESPIRATION RATE: 16 BRPM

## 2025-07-24 DIAGNOSIS — Z85.3 PERSONAL HISTORY OF MALIGNANT NEOPLASM OF BREAST: ICD-10-CM

## 2025-07-24 DIAGNOSIS — M85.80 OTHER SPECIFIED DISORDERS OF BONE DENSITY AND STRUCTURE, UNSPECIFIED SITE: ICD-10-CM

## 2025-07-24 DIAGNOSIS — M19.90 UNSPECIFIED OSTEOARTHRITIS, UNSPECIFIED SITE: ICD-10-CM

## 2025-07-24 DIAGNOSIS — C43.9 MALIGNANT MELANOMA OF SKIN, UNSPECIFIED: ICD-10-CM

## 2025-07-24 DIAGNOSIS — D05.12 INTRADUCTAL CARCINOMA IN SITU OF LEFT BREAST: ICD-10-CM

## 2025-07-24 DIAGNOSIS — Z78.0 OTHER SPECIFIED DISORDERS OF BONE DENSITY AND STRUCTURE, UNSPECIFIED SITE: ICD-10-CM

## 2025-07-24 PROCEDURE — 36415 COLL VENOUS BLD VENIPUNCTURE: CPT

## 2025-07-24 PROCEDURE — 99213 OFFICE O/P EST LOW 20 MIN: CPT

## 2025-07-24 RX ORDER — MULTIVIT WITH MIN/ALA/HERBS 50 MG
TABLET ORAL
Refills: 0 | Status: ACTIVE | COMMUNITY
Start: 2025-07-14

## 2025-08-08 ENCOUNTER — RESULT REVIEW (OUTPATIENT)
Age: 75
End: 2025-08-08

## 2025-08-14 ENCOUNTER — RESULT REVIEW (OUTPATIENT)
Age: 75
End: 2025-08-14

## 2025-08-14 ENCOUNTER — APPOINTMENT (OUTPATIENT)
Dept: ORTHOPEDIC SURGERY | Facility: HOSPITAL | Age: 75
End: 2025-08-14
Payer: MEDICARE

## 2025-08-14 ENCOUNTER — TRANSCRIPTION ENCOUNTER (OUTPATIENT)
Age: 75
End: 2025-08-14

## 2025-08-14 DIAGNOSIS — M17.11 UNILATERAL PRIMARY OSTEOARTHRITIS, RIGHT KNEE: ICD-10-CM

## 2025-08-14 PROCEDURE — 27447 TOTAL KNEE ARTHROPLASTY: CPT | Mod: RT

## 2025-08-14 PROCEDURE — 20985 CPTR-ASST DIR MS PX: CPT

## 2025-08-14 RX ORDER — CELECOXIB 200 MG/1
200 CAPSULE ORAL DAILY
Qty: 30 | Refills: 2 | Status: ACTIVE | COMMUNITY
Start: 2025-08-14 | End: 1900-01-01

## 2025-08-14 RX ORDER — PANTOPRAZOLE 20 MG/1
20 TABLET, DELAYED RELEASE ORAL DAILY
Qty: 30 | Refills: 2 | Status: ACTIVE | COMMUNITY
Start: 2025-08-14 | End: 1900-01-01

## 2025-08-14 RX ORDER — OXYCODONE 5 MG/1
5 TABLET ORAL
Qty: 42 | Refills: 0 | Status: ACTIVE | COMMUNITY
Start: 2025-08-14 | End: 1900-01-01

## 2025-08-18 RX ORDER — CYCLOBENZAPRINE HYDROCHLORIDE 5 MG/1
5 TABLET, FILM COATED ORAL
Qty: 30 | Refills: 0 | Status: ACTIVE | COMMUNITY
Start: 2025-08-18 | End: 1900-01-01

## 2025-08-20 DIAGNOSIS — Z96.651 PRESENCE OF RIGHT ARTIFICIAL KNEE JOINT: ICD-10-CM

## 2025-09-10 ENCOUNTER — APPOINTMENT (OUTPATIENT)
Dept: ORTHOPEDIC SURGERY | Facility: CLINIC | Age: 75
End: 2025-09-10
Payer: MEDICARE

## 2025-09-10 VITALS
BODY MASS INDEX: 30.9 KG/M2 | SYSTOLIC BLOOD PRESSURE: 142 MMHG | DIASTOLIC BLOOD PRESSURE: 85 MMHG | HEIGHT: 64 IN | WEIGHT: 181 LBS | HEART RATE: 79 BPM | OXYGEN SATURATION: 98 %

## 2025-09-10 DIAGNOSIS — Z96.651 PRESENCE OF RIGHT ARTIFICIAL KNEE JOINT: ICD-10-CM

## 2025-09-10 PROCEDURE — 99024 POSTOP FOLLOW-UP VISIT: CPT

## 2025-09-10 PROCEDURE — 73564 X-RAY EXAM KNEE 4 OR MORE: CPT | Mod: RT
